# Patient Record
Sex: FEMALE | Race: WHITE | NOT HISPANIC OR LATINO | Employment: OTHER | URBAN - METROPOLITAN AREA
[De-identification: names, ages, dates, MRNs, and addresses within clinical notes are randomized per-mention and may not be internally consistent; named-entity substitution may affect disease eponyms.]

---

## 2018-12-21 ENCOUNTER — HOSPITAL ENCOUNTER (OUTPATIENT)
Dept: RADIOLOGY | Facility: MEDICAL CENTER | Age: 63
End: 2018-12-21

## 2018-12-21 ENCOUNTER — APPOINTMENT (OUTPATIENT)
Dept: RADIOLOGY | Facility: MEDICAL CENTER | Age: 63
DRG: 814 | End: 2018-12-21
Payer: COMMERCIAL

## 2018-12-21 ENCOUNTER — HOSPITAL ENCOUNTER (INPATIENT)
Facility: MEDICAL CENTER | Age: 63
LOS: 5 days | DRG: 814 | End: 2018-12-26
Attending: SURGERY | Admitting: SURGERY
Payer: COMMERCIAL

## 2018-12-21 DIAGNOSIS — T14.90XA TRAUMA: ICD-10-CM

## 2018-12-21 DIAGNOSIS — S36.00XA INJURY OF SPLEEN, INITIAL ENCOUNTER: ICD-10-CM

## 2018-12-21 PROBLEM — E03.8 OTHER SPECIFIED HYPOTHYROIDISM: Status: ACTIVE | Noted: 2018-12-21

## 2018-12-21 PROBLEM — S22.32XD CLOSED FRACTURE OF ONE RIB OF LEFT SIDE WITH ROUTINE HEALING: Status: ACTIVE | Noted: 2018-12-21

## 2018-12-21 PROBLEM — E86.0 DEHYDRATION, MODERATE: Status: ACTIVE | Noted: 2018-12-21

## 2018-12-21 PROBLEM — E66.9 OBESITY (BMI 30-39.9): Status: ACTIVE | Noted: 2018-12-21

## 2018-12-21 PROBLEM — M32.8 OTHER FORMS OF SYSTEMIC LUPUS ERYTHEMATOSUS (HCC): Status: ACTIVE | Noted: 2018-12-21

## 2018-12-21 PROBLEM — I63.9 STROKE (HCC): Status: ACTIVE | Noted: 2018-12-21

## 2018-12-21 PROBLEM — I10 ESSENTIAL HYPERTENSION: Status: ACTIVE | Noted: 2018-12-21

## 2018-12-21 PROBLEM — D69.6 THROMBOCYTOPENIA (HCC): Status: ACTIVE | Noted: 2018-12-21

## 2018-12-21 PROBLEM — Z79.01 ANTICOAGULATED ON COUMADIN: Status: ACTIVE | Noted: 2018-12-21

## 2018-12-21 LAB
ABO GROUP BLD: NORMAL
ABO GROUP BLD: NORMAL
ALBUMIN SERPL BCP-MCNC: 3.6 G/DL (ref 3.2–4.9)
ALBUMIN/GLOB SERPL: 1.4 G/DL
ALP SERPL-CCNC: 66 U/L (ref 30–99)
ALT SERPL-CCNC: 13 U/L (ref 2–50)
ANION GAP SERPL CALC-SCNC: 12 MMOL/L (ref 0–11.9)
APPEARANCE UR: CLEAR
APTT PPP: 25.8 SEC (ref 24.7–36)
AST SERPL-CCNC: 20 U/L (ref 12–45)
BILIRUB SERPL-MCNC: 1.1 MG/DL (ref 0.1–1.5)
BILIRUB UR QL STRIP.AUTO: NEGATIVE
BLD GP AB SCN SERPL QL: NORMAL
BUN SERPL-MCNC: 42 MG/DL (ref 8–22)
CALCIUM SERPL-MCNC: 10 MG/DL (ref 8.5–10.5)
CFT BLD TEG: 4.8 MIN (ref 5–10)
CHLORIDE SERPL-SCNC: 107 MMOL/L (ref 96–112)
CLOT ANGLE BLD TEG: 67.2 DEGREES (ref 53–72)
CLOT LYSIS 30M P MA LENFR BLD TEG: 1.1 % (ref 0–8)
CO2 SERPL-SCNC: 20 MMOL/L (ref 20–33)
COLOR UR: YELLOW
CREAT SERPL-MCNC: 1.5 MG/DL (ref 0.5–1.4)
CT.EXTRINSIC BLD ROTEM: 1.7 MIN (ref 1–3)
ERYTHROCYTE [DISTWIDTH] IN BLOOD BY AUTOMATED COUNT: 50.3 FL (ref 35.9–50)
ETHANOL BLD-MCNC: 0 G/DL
GLOBULIN SER CALC-MCNC: 2.6 G/DL (ref 1.9–3.5)
GLUCOSE SERPL-MCNC: 195 MG/DL (ref 65–99)
GLUCOSE UR STRIP.AUTO-MCNC: NEGATIVE MG/DL
HCG SERPL QL: NEGATIVE
HCT VFR BLD AUTO: 30.1 % (ref 37–47)
HGB BLD-MCNC: 10.2 G/DL (ref 12–16)
HGB BLD-MCNC: 8.6 G/DL (ref 12–16)
INR PPP: 1.67 (ref 0.87–1.13)
KETONES UR STRIP.AUTO-MCNC: NEGATIVE MG/DL
LEUKOCYTE ESTERASE UR QL STRIP.AUTO: NEGATIVE
MCF BLD TEG: 68.2 MM (ref 50–70)
MCH RBC QN AUTO: 34.6 PG (ref 27–33)
MCHC RBC AUTO-ENTMCNC: 33.9 G/DL (ref 33.6–35)
MCV RBC AUTO: 102 FL (ref 81.4–97.8)
MICRO URNS: NORMAL
NITRITE UR QL STRIP.AUTO: NEGATIVE
PA AA BLD-ACNC: 67.3 %
PA ADP BLD-ACNC: 21.2 %
PH UR STRIP.AUTO: 5 [PH]
PLATELET # BLD AUTO: 111 K/UL (ref 164–446)
PMV BLD AUTO: 13.7 FL (ref 9–12.9)
POTASSIUM SERPL-SCNC: 4.7 MMOL/L (ref 3.6–5.5)
PROT SERPL-MCNC: 6.2 G/DL (ref 6–8.2)
PROT UR QL STRIP: NEGATIVE MG/DL
PROTHROMBIN TIME: 19.8 SEC (ref 12–14.6)
RBC # BLD AUTO: 2.95 M/UL (ref 4.2–5.4)
RBC UR QL AUTO: NEGATIVE
RH BLD: NORMAL
RH BLD: NORMAL
SODIUM SERPL-SCNC: 139 MMOL/L (ref 135–145)
SP GR UR STRIP.AUTO: 1.02
TEG ALGORITHM TGALG: ABNORMAL
UROBILINOGEN UR STRIP.AUTO-MCNC: 0.2 MG/DL
WBC # BLD AUTO: 10.3 K/UL (ref 4.8–10.8)

## 2018-12-21 PROCEDURE — 303105 HCHG CATHETER EXTRA

## 2018-12-21 PROCEDURE — 51702 INSERT TEMP BLADDER CATH: CPT

## 2018-12-21 PROCEDURE — 700105 HCHG RX REV CODE 258: Performed by: SURGERY

## 2018-12-21 PROCEDURE — 86900 BLOOD TYPING SEROLOGIC ABO: CPT

## 2018-12-21 PROCEDURE — 700111 HCHG RX REV CODE 636 W/ 250 OVERRIDE (IP): Performed by: SURGERY

## 2018-12-21 PROCEDURE — 86850 RBC ANTIBODY SCREEN: CPT

## 2018-12-21 PROCEDURE — 85576 BLOOD PLATELET AGGREGATION: CPT

## 2018-12-21 PROCEDURE — 81003 URINALYSIS AUTO W/O SCOPE: CPT

## 2018-12-21 PROCEDURE — 85018 HEMOGLOBIN: CPT

## 2018-12-21 PROCEDURE — 99291 CRITICAL CARE FIRST HOUR: CPT

## 2018-12-21 PROCEDURE — 770022 HCHG ROOM/CARE - ICU (200)

## 2018-12-21 PROCEDURE — 80307 DRUG TEST PRSMV CHEM ANLYZR: CPT

## 2018-12-21 PROCEDURE — 85610 PROTHROMBIN TIME: CPT

## 2018-12-21 PROCEDURE — 85730 THROMBOPLASTIN TIME PARTIAL: CPT

## 2018-12-21 PROCEDURE — 84703 CHORIONIC GONADOTROPIN ASSAY: CPT

## 2018-12-21 PROCEDURE — 96374 THER/PROPH/DIAG INJ IV PUSH: CPT

## 2018-12-21 PROCEDURE — G0390 TRAUMA RESPONS W/HOSP CRITI: HCPCS

## 2018-12-21 PROCEDURE — A9270 NON-COVERED ITEM OR SERVICE: HCPCS | Performed by: SURGERY

## 2018-12-21 PROCEDURE — 700102 HCHG RX REV CODE 250 W/ 637 OVERRIDE(OP): Performed by: SURGERY

## 2018-12-21 PROCEDURE — 85384 FIBRINOGEN ACTIVITY: CPT

## 2018-12-21 PROCEDURE — 85027 COMPLETE CBC AUTOMATED: CPT

## 2018-12-21 PROCEDURE — 80053 COMPREHEN METABOLIC PANEL: CPT

## 2018-12-21 PROCEDURE — 85347 COAGULATION TIME ACTIVATED: CPT

## 2018-12-21 PROCEDURE — C9132 KCENTRA, PER I.U.: HCPCS | Performed by: SURGERY

## 2018-12-21 PROCEDURE — 86901 BLOOD TYPING SEROLOGIC RH(D): CPT

## 2018-12-21 RX ORDER — ALLOPURINOL 300 MG/1
300 TABLET ORAL DAILY
COMMUNITY

## 2018-12-21 RX ORDER — ATENOLOL 25 MG/1
50 TABLET ORAL DAILY
COMMUNITY

## 2018-12-21 RX ORDER — SPIRONOLACTONE 50 MG/1
50 TABLET, FILM COATED ORAL 2 TIMES DAILY
COMMUNITY

## 2018-12-21 RX ORDER — POLYETHYLENE GLYCOL 3350 17 G/17G
1 POWDER, FOR SOLUTION ORAL 2 TIMES DAILY
Status: DISCONTINUED | OUTPATIENT
Start: 2018-12-21 | End: 2018-12-26 | Stop reason: HOSPADM

## 2018-12-21 RX ORDER — ACETAMINOPHEN 500 MG
1000 TABLET ORAL EVERY 6 HOURS
Status: DISCONTINUED | OUTPATIENT
Start: 2018-12-21 | End: 2018-12-26

## 2018-12-21 RX ORDER — DOCUSATE SODIUM 100 MG/1
100 CAPSULE, LIQUID FILLED ORAL 2 TIMES DAILY
Status: DISCONTINUED | OUTPATIENT
Start: 2018-12-22 | End: 2018-12-26 | Stop reason: HOSPADM

## 2018-12-21 RX ORDER — AMOXICILLIN 250 MG
1 CAPSULE ORAL
Status: DISCONTINUED | OUTPATIENT
Start: 2018-12-21 | End: 2018-12-26 | Stop reason: HOSPADM

## 2018-12-21 RX ORDER — TORSEMIDE 20 MG/1
20 TABLET ORAL DAILY
COMMUNITY

## 2018-12-21 RX ORDER — ONDANSETRON 2 MG/ML
4 INJECTION INTRAMUSCULAR; INTRAVENOUS EVERY 4 HOURS PRN
Status: DISCONTINUED | OUTPATIENT
Start: 2018-12-21 | End: 2018-12-26 | Stop reason: HOSPADM

## 2018-12-21 RX ORDER — ENEMA 19; 7 G/133ML; G/133ML
1 ENEMA RECTAL
Status: DISCONTINUED | OUTPATIENT
Start: 2018-12-21 | End: 2018-12-26 | Stop reason: HOSPADM

## 2018-12-21 RX ORDER — AMOXICILLIN 250 MG
1 CAPSULE ORAL NIGHTLY
Status: DISCONTINUED | OUTPATIENT
Start: 2018-12-21 | End: 2018-12-24

## 2018-12-21 RX ORDER — BISACODYL 10 MG
10 SUPPOSITORY, RECTAL RECTAL
Status: DISCONTINUED | OUTPATIENT
Start: 2018-12-21 | End: 2018-12-26 | Stop reason: HOSPADM

## 2018-12-21 RX ORDER — SODIUM CHLORIDE, SODIUM LACTATE, POTASSIUM CHLORIDE, CALCIUM CHLORIDE 600; 310; 30; 20 MG/100ML; MG/100ML; MG/100ML; MG/100ML
INJECTION, SOLUTION INTRAVENOUS CONTINUOUS
Status: DISCONTINUED | OUTPATIENT
Start: 2018-12-21 | End: 2018-12-23

## 2018-12-21 RX ORDER — FAMOTIDINE 20 MG/1
20 TABLET, FILM COATED ORAL 2 TIMES DAILY
Status: DISCONTINUED | OUTPATIENT
Start: 2018-12-21 | End: 2018-12-21

## 2018-12-21 RX ADMIN — ACETAMINOPHEN 1000 MG: 500 TABLET ORAL at 23:47

## 2018-12-21 RX ADMIN — SODIUM CHLORIDE, POTASSIUM CHLORIDE, SODIUM LACTATE AND CALCIUM CHLORIDE: 600; 310; 30; 20 INJECTION, SOLUTION INTRAVENOUS at 18:15

## 2018-12-21 RX ADMIN — PROTHROMBIN, COAGULATION FACTOR VII HUMAN, COAGULATION FACTOR IX HUMAN, COAGULATION FACTOR X HUMAN, PROTEIN C, PROTEIN S HUMAN, AND WATER 2669 UNITS: KIT at 16:36

## 2018-12-21 ASSESSMENT — COPD QUESTIONNAIRES
DURING THE PAST 4 WEEKS HOW MUCH DID YOU FEEL SHORT OF BREATH: NONE/LITTLE OF THE TIME
DO YOU EVER COUGH UP ANY MUCUS OR PHLEGM?: NO/ONLY WITH OCCASIONAL COLDS OR INFECTIONS
HAVE YOU SMOKED AT LEAST 100 CIGARETTES IN YOUR ENTIRE LIFE: NO/DON'T KNOW
COPD SCREENING SCORE: 2

## 2018-12-21 ASSESSMENT — PAIN SCALES - GENERAL
PAINLEVEL_OUTOF10: 0

## 2018-12-21 ASSESSMENT — LIFESTYLE VARIABLES: EVER_SMOKED: NEVER

## 2018-12-21 NOTE — ED PROVIDER NOTES
ED Provider Note    CHIEF COMPLAINT  No chief complaint on file.      HPI  Sophia Ordonez is a 63 y.o. female who presents for evaluation of left upper quadrant pain, known splenic injury.  The patient was transferred from [facility].  Apparently 4 days ago she had a ground-level fall struck her left flank and left upper quadrant.  She did not seek medical care.  Symptoms became worse she presented to a small [facility] hospital.  Of note she has a history of lupus anticoagulant and Coumadin.  At the office facility she had a noncontrast CT scan which demonstrated a significant splenic injury with hemoperitoneum.  She had a hemoglobin of 10, she was given vitamin K intramuscularly, no blood products were administered and she was transported here.  She did not receive any blood products prior to arrival and she had no documented episodes of systolic pressure less than 90.  He denies any injury to the head neck chest upper or lower extremity    REVIEW OF SYSTEMS  See HPI for further details.  No loss of consciousness numbness weakness or tingling all other systems are negative.     PAST MEDICAL HISTORY  Past Medical History:   Diagnosis Date   • Shingles    • Lupus    • Hypertension    • Hypothyroid    • Murmur    • Pleurisy    • Pneumonia        FAMILY HISTORY  Noncontributory    SOCIAL HISTORY  Social History     Social History   • Marital status: Single     Spouse name: N/A   • Number of children: N/A   • Years of education: N/A     Social History Main Topics   • Smoking status: Never Smoker   • Smokeless tobacco: Not on file   • Alcohol use Yes      Comment: rare   • Drug use: No   • Sexual activity: Not on file     Other Topics Concern   • Not on file     Social History Narrative   • No narrative on file     No IV drug  SURGICAL HISTORY  Past Surgical History:   Procedure Laterality Date   • GYN SURGERY         • CA TMJ ARTHROSCOPY/DIAGNOSTIC     • TONSILLECTOMY     •  "TONSILLECTOMY         CURRENT MEDICATIONS  Home Medications    **Home medications have not yet been reviewed for this encounter**       Current Facility-Administered Medications:   •  prothrombin complex conc human (KCENTRA) 1000 units kit KIT 2,669 Units, 2,669 Units, Intravenous, Once, Shawn Garcia M.D., 2,669 Units at 12/21/18 1636    Current Outpatient Prescriptions:   •  atenolol (TENORMIN) 50 MG Tab, Take 50 mg by mouth every day., Disp: , Rfl:   •  spironolactone (ALDACTONE) 50 MG Tab, Take 50 mg by mouth every day., Disp: , Rfl:   •  allopurinol (ZYLOPRIM) 300 MG Tab, Take 300 mg by mouth every day., Disp: , Rfl:   •  torsemide (DEMADEX) 20 MG Tab, Take 40 mg by mouth every day., Disp: , Rfl:   •  warfarin (COUMADIN) 5 MG TABS, Take 5 mg by mouth every day. Follow up with [facility] , Disp: , Rfl:   •  levothyroxine (SYNTHROID) 100 MCG TABS, Take 100 mcg by mouth every day., Disp: , Rfl:   •  hydroxychloroquine (PLAQUINIL) 200 MG TABS, Take 200 mg by mouth 2 Times a Day., Disp: , Rfl:   •  aspirin 81 MG tablet, Take 81 mg by mouth every day., Disp: , Rfl:       ALLERGIES  Allergies   Allergen Reactions   • Diovan [Valsartan] Rash   • Micardis [Telmisartan]      Migraine headaches   • Pcn [Penicillins] Rash   • Sulfa Drugs        PHYSICAL EXAM  VITAL SIGNS: /62   Pulse 78   Temp 36.4 °C (97.5 °F) (Temporal)   Resp 20   Ht 1.702 m (5' 7\")   Wt 113.4 kg (250 lb)   SpO2 96%   BMI 39.16 kg/m²       Constitutional: Ill-appearing  HENT: Normocephalic, Atraumatic, Bilateral external ears normal, Oropharynx moist, No oral exudates, Nose normal.   Eyes: PERRLA, EOMI, Conjunctiva normal, No discharge.   Neck: Normal range of motion, No tenderness, Supple, No stridor.   Cardiovascular: Normal heart rate, Normal rhythm, No murmurs, No rubs, No gallops.   Thorax & Lungs: Normal breath sounds, No respiratory distress, No wheezing, No chest tenderness.   Abdomen: Bowel sounds normal, Soft, left " upper quadrant tenderness with ecchymosis noted over the left lateral abdominal and flank  Skin: Warm, Dry, No erythema, No rash.   Back: No tenderness, No CVA tenderness.   Extremities: Intact distal pulses, No edema, No tenderness, No cyanosis, No clubbing. .   Neurologic: Alert & oriented x 3, Normal motor function, Normal sensory function, No focal deficits noted.   Psychiatric: Anxious      RADIOLOGY/PROCEDURES  Results for orders placed or performed during the hospital encounter of 12/21/18   DIAGNOSTIC ALCOHOL   Result Value Ref Range    Diagnostic Alcohol 0.00 0.00 g/dL   CBC WITHOUT DIFFERENTIAL   Result Value Ref Range    WBC 10.3 4.8 - 10.8 K/uL    RBC 2.95 (L) 4.20 - 5.40 M/uL    Hemoglobin 10.2 (L) 12.0 - 16.0 g/dL    Hematocrit 30.1 (L) 37.0 - 47.0 %    .0 (H) 81.4 - 97.8 fL    MCH 34.6 (H) 27.0 - 33.0 pg    MCHC 33.9 33.6 - 35.0 g/dL    RDW 50.3 (H) 35.9 - 50.0 fL    Platelet Count 111 (L) 164 - 446 K/uL    MPV 13.7 (H) 9.0 - 12.9 fL   COMP METABOLIC PANEL   Result Value Ref Range    Sodium 139 135 - 145 mmol/L    Potassium 4.7 3.6 - 5.5 mmol/L    Chloride 107 96 - 112 mmol/L    Co2 20 20 - 33 mmol/L    Anion Gap 12.0 (H) 0.0 - 11.9    Glucose 195 (H) 65 - 99 mg/dL    Bun 42 (H) 8 - 22 mg/dL    Creatinine 1.50 (H) 0.50 - 1.40 mg/dL    Calcium 10.0 8.5 - 10.5 mg/dL    AST(SGOT) 20 12 - 45 U/L    ALT(SGPT) 13 2 - 50 U/L    Alkaline Phosphatase 66 30 - 99 U/L    Total Bilirubin 1.1 0.1 - 1.5 mg/dL    Albumin 3.6 3.2 - 4.9 g/dL    Total Protein 6.2 6.0 - 8.2 g/dL    Globulin 2.6 1.9 - 3.5 g/dL    A-G Ratio 1.4 g/dL   Prothrombin Time   Result Value Ref Range    PT 19.8 (H) 12.0 - 14.6 sec    INR 1.67 (H) 0.87 - 1.13   APTT   Result Value Ref Range    APTT 25.8 24.7 - 36.0 sec   HCG QUAL SERUM   Result Value Ref Range    Beta-Hcg Qualitative Serum Negative Negative   COD - Adult (Type and Screen)   Result Value Ref Range    ABO Grouping Only A     Rh Grouping Only POS     Antibody Screen-Cod NEG     ABO AND RH CONFIRMATION   Result Value Ref Range    ABO Grouping Only A     Second Rh Group POS    ESTIMATED GFR   Result Value Ref Range    GFR If  43 (A) >60 mL/min/1.73 m 2    GFR If Non African American 35 (A) >60 mL/min/1.73 m 2      CT scan from the outside facility was performed without contrast.  It demonstrates hemoperitoneum with a splenic injury no IV contrast was administered.  COURSE & MEDICAL DECISION MAKING  Pertinent Labs & Imaging studies reviewed. (See chart for details)  Patient was a trauma yellow, trauma surgery is at the bedside on arrival.  Here she had borderline hypotension with systolic pressure of 90/60.  We reviewed her laboratory studies as well as her CT scan.  Repeat laboratory studies demonstrate a stable hemoglobin however she has ongoing coagulopathy.  She is in guarded condition given her age, comorbidities coagulopathy.  She was given PCC therapy in coordination with the trauma team should be admitted to the trauma ICU for close observation however operative intervention is not planned at this time.  Patient is in very guarded condition    CRITICAL CARE TIME:    The patient required approximately 38 minutes worth of critical care time. This excludes any procedures. This includes time spent directly at caring for the patient, making critical medical decisions, involving consultants and speaking with the family.    FINAL IMPRESSION  1.  Compensated hemorrhagic shock  2.  Splenic injury  3.  Coagulopathy due to Coumadin    Admission to ICU        Electronically signed by: Andrei Segovia, 12/21/2018 3:38 PM

## 2018-12-21 NOTE — ASSESSMENT & PLAN NOTE
Chronic condition treated with Atenolol, Demadex, and Aldactone.  12/24 Resume maintenance medication Atenolol   Hold Demadex and Aldactone. (trend renal indices)

## 2018-12-21 NOTE — ASSESSMENT & PLAN NOTE
Tripped over cat 4 days PTA - struck left side on couch   Seen at [facility] on 12/21  Trauma Yellow Transfer Activation.  Shawn Garcia MD. Trauma Surgery.

## 2018-12-21 NOTE — ASSESSMENT & PLAN NOTE
Chronic condition treated with Plaquenil.  12/24Resumed maintenance medication.  No steroids > 10 yrs  Chronic kidney disease , creatinine generally in the 1.4-1.5 range

## 2018-12-21 NOTE — ASSESSMENT & PLAN NOTE
No contrast given. Large amount of left upper quadrant perisplenic acute hemorrhage.   Trend abdominal exam and laboratory studies

## 2018-12-21 NOTE — ASSESSMENT & PLAN NOTE
Single left rib fracture   Aggressive multimodal pain management and pulmonary hygiene.   Trend chest radiographs.

## 2018-12-22 ENCOUNTER — APPOINTMENT (OUTPATIENT)
Dept: RADIOLOGY | Facility: MEDICAL CENTER | Age: 63
DRG: 814 | End: 2018-12-22
Attending: SURGERY
Payer: COMMERCIAL

## 2018-12-22 LAB
ALBUMIN SERPL BCP-MCNC: 3 G/DL (ref 3.2–4.9)
ALBUMIN/GLOB SERPL: 1.4 G/DL
ALP SERPL-CCNC: 52 U/L (ref 30–99)
ALT SERPL-CCNC: 9 U/L (ref 2–50)
ANION GAP SERPL CALC-SCNC: 7 MMOL/L (ref 0–11.9)
APTT PPP: 23.8 SEC (ref 24.7–36)
AST SERPL-CCNC: 16 U/L (ref 12–45)
BARCODED ABORH UBTYP: 6200
BARCODED PRD CODE UBPRD: NORMAL
BARCODED UNIT NUM UBUNT: NORMAL
BASOPHILS # BLD AUTO: 0.1 % (ref 0–1.8)
BASOPHILS # BLD: 0.01 K/UL (ref 0–0.12)
BILIRUB SERPL-MCNC: 0.9 MG/DL (ref 0.1–1.5)
BUN SERPL-MCNC: 44 MG/DL (ref 8–22)
CALCIUM SERPL-MCNC: 9.4 MG/DL (ref 8.5–10.5)
CHLORIDE SERPL-SCNC: 109 MMOL/L (ref 96–112)
CO2 SERPL-SCNC: 24 MMOL/L (ref 20–33)
COMPONENT R 8504R: NORMAL
CREAT SERPL-MCNC: 1.47 MG/DL (ref 0.5–1.4)
EOSINOPHIL # BLD AUTO: 0 K/UL (ref 0–0.51)
EOSINOPHIL NFR BLD: 0 % (ref 0–6.9)
ERYTHROCYTE [DISTWIDTH] IN BLOOD BY AUTOMATED COUNT: 50 FL (ref 35.9–50)
GLOBULIN SER CALC-MCNC: 2.2 G/DL (ref 1.9–3.5)
GLUCOSE SERPL-MCNC: 131 MG/DL (ref 65–99)
HCT VFR BLD AUTO: 22.1 % (ref 37–47)
HGB BLD-MCNC: 7 G/DL (ref 12–16)
HGB BLD-MCNC: 7.2 G/DL (ref 12–16)
HGB BLD-MCNC: 7.5 G/DL (ref 12–16)
IMM GRANULOCYTES # BLD AUTO: 0.03 K/UL (ref 0–0.11)
IMM GRANULOCYTES NFR BLD AUTO: 0.4 % (ref 0–0.9)
LYMPHOCYTES # BLD AUTO: 0.84 K/UL (ref 1–4.8)
LYMPHOCYTES NFR BLD: 12 % (ref 22–41)
MCH RBC QN AUTO: 34.6 PG (ref 27–33)
MCHC RBC AUTO-ENTMCNC: 33.9 G/DL (ref 33.6–35)
MCV RBC AUTO: 101.8 FL (ref 81.4–97.8)
MONOCYTES # BLD AUTO: 0.62 K/UL (ref 0–0.85)
MONOCYTES NFR BLD AUTO: 8.9 % (ref 0–13.4)
NEUTROPHILS # BLD AUTO: 5.5 K/UL (ref 2–7.15)
NEUTROPHILS NFR BLD: 78.6 % (ref 44–72)
NRBC # BLD AUTO: 0 K/UL
NRBC BLD-RTO: 0 /100 WBC
PLATELET # BLD AUTO: 78 K/UL (ref 164–446)
PMV BLD AUTO: 13.7 FL (ref 9–12.9)
POTASSIUM SERPL-SCNC: 4.9 MMOL/L (ref 3.6–5.5)
PRODUCT TYPE UPROD: NORMAL
PROT SERPL-MCNC: 5.2 G/DL (ref 6–8.2)
RBC # BLD AUTO: 2.17 M/UL (ref 4.2–5.4)
SODIUM SERPL-SCNC: 140 MMOL/L (ref 135–145)
UNIT STATUS USTAT: NORMAL
WBC # BLD AUTO: 7 K/UL (ref 4.8–10.8)

## 2018-12-22 PROCEDURE — 700105 HCHG RX REV CODE 258: Performed by: SURGERY

## 2018-12-22 PROCEDURE — 36430 TRANSFUSION BLD/BLD COMPNT: CPT

## 2018-12-22 PROCEDURE — 86923 COMPATIBILITY TEST ELECTRIC: CPT

## 2018-12-22 PROCEDURE — 80053 COMPREHEN METABOLIC PANEL: CPT

## 2018-12-22 PROCEDURE — 99291 CRITICAL CARE FIRST HOUR: CPT | Performed by: SURGERY

## 2018-12-22 PROCEDURE — 71045 X-RAY EXAM CHEST 1 VIEW: CPT

## 2018-12-22 PROCEDURE — 85730 THROMBOPLASTIN TIME PARTIAL: CPT

## 2018-12-22 PROCEDURE — 30233N1 TRANSFUSION OF NONAUTOLOGOUS RED BLOOD CELLS INTO PERIPHERAL VEIN, PERCUTANEOUS APPROACH: ICD-10-PCS | Performed by: SURGERY

## 2018-12-22 PROCEDURE — 93970 EXTREMITY STUDY: CPT

## 2018-12-22 PROCEDURE — A6250 SKIN SEAL PROTECT MOISTURIZR: HCPCS | Performed by: SURGERY

## 2018-12-22 PROCEDURE — 700102 HCHG RX REV CODE 250 W/ 637 OVERRIDE(OP): Performed by: SURGERY

## 2018-12-22 PROCEDURE — A9270 NON-COVERED ITEM OR SERVICE: HCPCS | Performed by: SURGERY

## 2018-12-22 PROCEDURE — 85018 HEMOGLOBIN: CPT | Mod: 91

## 2018-12-22 PROCEDURE — 85025 COMPLETE CBC W/AUTO DIFF WBC: CPT

## 2018-12-22 PROCEDURE — 93970 EXTREMITY STUDY: CPT | Mod: 26 | Performed by: SURGERY

## 2018-12-22 PROCEDURE — 770022 HCHG ROOM/CARE - ICU (200)

## 2018-12-22 PROCEDURE — P9016 RBC LEUKOCYTES REDUCED: HCPCS

## 2018-12-22 RX ADMIN — SODIUM CHLORIDE, POTASSIUM CHLORIDE, SODIUM LACTATE AND CALCIUM CHLORIDE: 600; 310; 30; 20 INJECTION, SOLUTION INTRAVENOUS at 11:53

## 2018-12-22 RX ADMIN — ACETAMINOPHEN 1000 MG: 500 TABLET ORAL at 05:02

## 2018-12-22 RX ADMIN — ACETAMINOPHEN 1000 MG: 500 TABLET ORAL at 23:25

## 2018-12-22 ASSESSMENT — PAIN SCALES - GENERAL
PAINLEVEL_OUTOF10: 0

## 2018-12-22 ASSESSMENT — ENCOUNTER SYMPTOMS: ABDOMINAL PAIN: 1

## 2018-12-22 NOTE — H&P
Trauma History and Physical  2018    Attending Physician: Shawn Garcia MD.     CC: Trauma The patient was triaged as a Trauma Yellow Transfer in accordance with established pre hospital protols. An expeditious primary and secondary survey with required adjuncts was conducted. See Trauma Narrator for full details.    HPI: This is a 63 y.o female presents to [facility] Emergency Department for evaluation of splenic injury after a fall 4 days ago. The patient was a transfer from [facility]. The patient states she tripped over her cat 4 days ago and struck her left flank and left upper quadrant.  She did not seek medical care at that time.  She developed increasing abdominal pain today which became worse.  She presented to the [facility] for evaluation.  She has a history of lupus anticoagulant and Coumadin.  At the outlying facility, she had a noncontrast CT scan which demonstrated a splenic injury with surrounding clot and hemoperitoneum.  She was found to have a Hb of 10 - her baseline is 15. She was given vitamin K intramuscularly prior to transport. She was given no blood products at the sending facility.  She had no documented episodes of systolic pressure less than 90.  She denies any injury to the head neck chest upper or lower extremity .  She did not lose consciousness.     Past Medical History:   Diagnosis Date   • Hypertension    • Hypothyroid    • Lupus    • Murmur    • Pleurisy    • Pneumonia    • Shingles        Past Surgical History:   Procedure Laterality Date   • GYN SURGERY         • PB TMJ ARTHROSCOPY/DIAGNOSTIC     • TONSILLECTOMY     • TONSILLECTOMY         Current Facility-Administered Medications   Medication Dose Route Frequency Provider Last Rate Last Dose   • Respiratory Care per Protocol   Nebulization Continuous RT Shawn Garcia M.D.       • Pharmacy Consult Request ...Pain Management Review 1 Each  1 Each  Other PRN Shawn Garcia M.D.       • [START ON 12/22/2018] docusate sodium (COLACE) capsule 100 mg  100 mg Oral BID Shawn Garcia M.D.       • senna-docusate (PERICOLACE or SENOKOT S) 8.6-50 MG per tablet 1 Tab  1 Tab Oral Nightly Shawn Garcia M.D.       • senna-docusate (PERICOLACE or SENOKOT S) 8.6-50 MG per tablet 1 Tab  1 Tab Oral Q24HRS PRN Shawn Garcia M.D.       • polyethylene glycol/lytes (MIRALAX) PACKET 1 Packet  1 Packet Oral BID Shawn Garcia M.D.       • [START ON 12/22/2018] magnesium hydroxide (MILK OF MAGNESIA) suspension 30 mL  30 mL Oral DAILY Shawn Garcia M.D.       • bisacodyl (DULCOLAX) suppository 10 mg  10 mg Rectal Q24HRS PRN Shawn Garcia M.D.       • fleet enema 133 mL  1 Each Rectal Once PRN Shawn Garcia M.D.       • LR infusion   Intravenous Continuous Shawn Garcia M.D.       • acetaminophen (TYLENOL) tablet 1,000 mg  1,000 mg Oral Q6HRS Shawn Garcia M.D.       • fentaNYL (SUBLIMAZE) injection 50 mcg  50 mcg Intravenous Q HOUR PRN Shawn Garcia M.D.       • ondansetron (ZOFRAN) syringe/vial injection 4 mg  4 mg Intravenous Q4HRS PRN Shawn Garcia M.D.         Current Outpatient Prescriptions   Medication Sig Dispense Refill   • atenolol (TENORMIN) 50 MG Tab Take 50 mg by mouth every day.     • spironolactone (ALDACTONE) 50 MG Tab Take 50 mg by mouth every day.     • allopurinol (ZYLOPRIM) 300 MG Tab Take 300 mg by mouth every day.     • torsemide (DEMADEX) 20 MG Tab Take 40 mg by mouth every day.     • warfarin (COUMADIN) 5 MG TABS Take 5 mg by mouth every day. Follow up with Coumadin Clinic      • levothyroxine (SYNTHROID) 100 MCG TABS Take 100 mcg by mouth every day.     • hydroxychloroquine (PLAQUINIL) 200 MG TABS Take 200 mg by mouth 2 Times a Day.     • aspirin 81 MG tablet Take 81 mg by mouth every day.         Social History     Social History   • Marital status: Single     Spouse name: N/A  "  • Number of children: N/A   • Years of education: N/A     Occupational History   • Not on file.     Social History Main Topics   • Smoking status: Never Smoker   • Smokeless tobacco: Not on file   • Alcohol use Yes      Comment: rare   • Drug use: No   • Sexual activity: Not on file     Other Topics Concern   • Not on file     Social History Narrative   • No narrative on file       No family history on file.    Allergies:  Diovan [valsartan]; Micardis [telmisartan]; Pcn [penicillins]; and Sulfa drugs    Review of Systems:  Constitutional: Negative for fever, chills, weight loss, malaise/fatigue and diaphoresis.   HENT: Negative for hearing loss, ear pain, nosebleeds, congestion, sore throat, neck pain, and ear discharge.    Eyes: Negative for blurred vision, double vision, and redness.   Respiratory: Negative for cough, sputum production, shortness of breath, wheezing and stridor.   Cardiovascular: Negative for chest pain, palpitations.   Gastrointestinal: Negative for heartburn, nausea, vomiting,  diarrhea, constipation. Positive for abdominal pain.  Genitourinary: Negative for dysuria, urgency, frequency.   Musculoskeletal: Negative for myalgias, back pain, joint pain and falls.   Skin: Negative for itching and rash.  Neurological: Negative for dizziness, loss of consciousness, weakness and headaches.   Endo/Heme/Allergies: Negative for environmental allergies. Does not bruise/bleed easily.   Psychiatric/Behavioral: Negative for depression and substance abuse. The patient is not nervous/anxious.    Physical Exam:  Blood pressure 120/62, pulse 68, temperature 36.4 °C (97.5 °F), temperature source Temporal, resp. rate (!) 21, height 1.702 m (5' 7\"), weight 113.4 kg (250 lb), SpO2 100 %, not currently breastfeeding.    Constitutional: Awake, alert, oriented x3. No acute distress. GCS 15. E4 V5 M6.  Head: No cephalohematoma. Pupils are 3 mm,  reactive bilaterally. Midface stable. No malocclusion.  TMs clear " bilaterally. No drainage from the mouth or nose.  Neck: No tracheal deviation. No midline cervical spine tenderness. C-collar in place. No cervical seatbelt sign.  Cardiovascular: Normal rate, regular rhythm, normal heart sounds and intact distal pulses.  Exam reveals no gallop and no friction rub.  No murmur heard.  Pulmonary/Chest: Clavicles nontender to palpation. There is no chest wall tenderness bilaterally.  No crepitus. Positive breath sounds bilaterally.   Abdominal: Soft, obese, nondistended. Bowel sounds normal.  Left upper quadrant tenderness with ecchymosis noted over the left lateral abdominal and flank. No guarding or rebound tenderness. Pelvis is stable to anterior-posterior compression. No abdominal seatbelt sign.   Musculoskeletal: Right upper extremity grossly atraumatic, palpable radial pulse. 5/5  strength. Full ROM and strength at elbow.  Left upper extremity grossly atraumatic, palpable radial pulse. 5/5  strength. Full ROM and strength at elbow.  Right lower extremity grossly atraumatic. 5/5 strength in ankle plantar flexion and dorsiflexion. No pain and full ROM at right knee and hip. 2 + LE edema  Left  lower extremity grossly atraumatic. 5/5 strength in ankle plantar flexion and dorsiflexion. No pain and full ROM at left knee and hip.  2 + LE edema  Back: Midline thoracic and lumbar spines are nontender to palpation. No step-offs.   : Normal female external genitalia. Rectal exam not done. No blood visible at urethral meatus.   Neurological: Sensation intact to light touch dorsum and plantar surfaces of both feet and the medial and lateral aspects of both lower legs.  Sensation intact to light touch dorsum and plantar surfaces of both hands.   Skin: Skin is warm and dry.  No diaphoresis. No erythema. No pallor.     Labs:  Recent Labs      12/21/18   1532   WBC  10.3   RBC  2.95*   HEMOGLOBIN  10.2*   HEMATOCRIT  30.1*   MCV  102.0*   MCH  34.6*   MCHC  33.9   RDW  50.3*    PLATELETCT  111*   MPV  13.7*     Recent Labs      12/21/18   1532   SODIUM  139   POTASSIUM  4.7   CHLORIDE  107   CO2  20   GLUCOSE  195*   BUN  42*   CREATININE  1.50*   CALCIUM  10.0     Recent Labs      12/21/18   1532   APTT  25.8   INR  1.67*     Recent Labs      12/21/18   1532   ASTSGOT  20   ALTSGPT  13   TBILIRUBIN  1.1   ALKPHOSPHAT  66   GLOBULIN  2.6   INR  1.67*       Radiology:  OUTSIDE IMAGES-CT ABDOMEN /PELVIS   Final Result            Assessment: This is a 63 y.o. 4 day old spleen injury, anticoagulation with coumadin, dehydration, left rib fracture x 1, obesity    Plan:   Admit to ICU  KCentra 25 units / kg  Serial H/H  Spleen immunizations  High risk for needing transfusion  High risk for needing splenectomy    Trauma  Tripped over cat 4 days PTA - struck left side on couch   Se[facility] at [facility] on 12/21  Trauma Yellow Transfer Activation.  Shawn Garcia MD. Trauma Surgery.    Spleen injury  Large amount of left upper quadrant perisplenic acute hemorrhage.   No contrast given  Serial hemoglobin   Serial abdominal exams    Closed fracture of one rib of left side with routine healing  Single left rib fracture   Aggressive multimodal pain management and pulmonary hygiene.   Serial chest radiographs.     Stroke (HCC)  Premorbid  On coumadin for prevention    Anticoagulated on Coumadin  Vitamin K prior to arrival  INR 1.67  KCentra in ER ~ 25 units /kg    Other specified hypothyroidism  Chronic condition treated with levothyroxine   Resume maintenance medication when appropriate     Other forms of systemic lupus erythematosus (HCC)  Plaquenil   No steroids > 10 yrs    Essential hypertension  Chronic condition treated with Metoprolol.  Resume maintenance medication when appropriate     Obesity (BMI 30-39.9)  BMI 39.2    Dehydration, moderate  Dry mucus membranes  BUN 42 / Cr 1.5  Follow    Thrombocytopenia (HCC)  Platelet count 111  Follow    Time spent: Trauma / Critical Care Time 90 minutes  excluding procedures.    Shawn Garcia MD  [facility]  [phone]

## 2018-12-22 NOTE — PROGRESS NOTES
2 RN Skin check, areas of concern include, bruising on the left flank, redness under bilateral breasts, and panus. Barrier paste applied. Mepilex in place on pink sacrum.

## 2018-12-22 NOTE — DISCHARGE PLANNING
"Trauma Response    Referral: Trauma yellow Response    Intervention: SW responded to trauma yellow transfer from [facility].  Pt was BIB [facility] after falling over her cat 4 days ago.  Pt was alert upon arrival.  Pts name is Sophia Ordonez (: 1955).  SW obtained the following pt information: MSW met with pt. Pt stated her sister Fiordaliza \"Monica\" Deepthi ([phone]) and her daughter Isabel are already on their way from [place], [place]. Should arrive shortly at [facility]. No other needs from pt at this time.     Plan: MSW to remain available for support      "

## 2018-12-22 NOTE — PROGRESS NOTES
2 RN skin check.    Pt has:    -left flank hematoma/purple ecchymosis  -redness/rash under breasts, bilaterally. Barrier paste applied.  -redness/rash underneath panus/abdomen  -dry cracking/edematous feet, bilaterally.    mepilex in place and wound pictures taken.

## 2018-12-22 NOTE — CARE PLAN
Problem: Communication  Goal: The ability to communicate needs accurately and effectively will improve  Outcome: PROGRESSING AS EXPECTED  Encourage patient to voice concerns over pain and discomfort.     Problem: Safety  Goal: Will remain free from injury  Outcome: PROGRESSING AS EXPECTED  Oriented patient to call light, educated on calling for assistance. Bed in locked and lowest position.

## 2018-12-22 NOTE — PROGRESS NOTES
"Pt to S108 at this time.    VS are as follows:    HR 67 sinus  /84  RR 14  o2 100% via 2L NC  Temp 97.7f  Weight 115.7kg    Pt is AOx4 and in no apparent distress at this time.    C/o mild nausea when turning, but states she is \"okay right now.\" does not want medication at this time.      "

## 2018-12-22 NOTE — ED NOTES
Med Rec completed per patient, [facility]   Allergies reviewed  No ORAL antibiotics in last 30 days

## 2018-12-22 NOTE — PROGRESS NOTES
Trauma / Surgical Daily Progress Note    Date of Service  12/22/2018    Chief Complaint  63 y.o. female admitted 12/21/2018 with Trauma    Interval Events  Hemodynamics have been stable  Significant hemoglobin drift  Continued potential for acute deterioration  Tertiary survey completed and negative  Continue ICU  Azotemia reflects chronic kidney disease due to lupus  No peritoneal findings this morning    Review of Systems  Review of Systems   Cardiovascular: Positive for chest pain.   Gastrointestinal: Positive for abdominal pain.   All other systems reviewed and are negative.       Vital Signs for last 24 hours  Temp:  [36.4 °C (97.5 °F)-36.9 °C (98.4 °F)] 36.9 °C (98.4 °F)  Pulse:  [] 68  Resp:  [16-27] 27  BP: (120-124)/(44-62) 120/62    Hemodynamic parameters for last 24 hours       Respiratory Data     Respiration: (!) 27, Pulse Oximetry: 99 %        RUL Breath Sounds: Clear, RML Breath Sounds: Diminished, RLL Breath Sounds: Diminished, GUERITA Breath Sounds: Clear, LLL Breath Sounds: Diminished    Physical Exam  Physical Exam   Constitutional: She is oriented to person, place, and time. She appears well-developed.   HENT:   Head: Normocephalic.   Eyes: Pupils are equal, round, and reactive to light. Conjunctivae and EOM are normal. No scleral icterus.   Neck: Normal range of motion. Neck supple. No JVD present. No tracheal deviation present. No thyromegaly present.   Cardiovascular: Normal rate, regular rhythm, normal heart sounds and intact distal pulses.    No murmur heard.  Pulmonary/Chest: Effort normal and breath sounds normal. No respiratory distress.   Abdominal: Soft. Bowel sounds are normal. She exhibits no distension. There is tenderness. There is no rebound.   Musculoskeletal: She exhibits no edema.   Lymphadenopathy:     She has no cervical adenopathy.   Neurological: She is alert and oriented to person, place, and time. No cranial nerve deficit.   Skin: Skin is warm and dry.   Psychiatric: She  has a normal mood and affect.   Nursing note and vitals reviewed.      Laboratory  Recent Results (from the past 24 hour(s))   DIAGNOSTIC ALCOHOL    Collection Time: 12/21/18  3:32 PM   Result Value Ref Range    Diagnostic Alcohol 0.00 0.00 g/dL   CBC WITHOUT DIFFERENTIAL    Collection Time: 12/21/18  3:32 PM   Result Value Ref Range    WBC 10.3 4.8 - 10.8 K/uL    RBC 2.95 (L) 4.20 - 5.40 M/uL    Hemoglobin 10.2 (L) 12.0 - 16.0 g/dL    Hematocrit 30.1 (L) 37.0 - 47.0 %    .0 (H) 81.4 - 97.8 fL    MCH 34.6 (H) 27.0 - 33.0 pg    MCHC 33.9 33.6 - 35.0 g/dL    RDW 50.3 (H) 35.9 - 50.0 fL    Platelet Count 111 (L) 164 - 446 K/uL    MPV 13.7 (H) 9.0 - 12.9 fL   COMP METABOLIC PANEL    Collection Time: 12/21/18  3:32 PM   Result Value Ref Range    Sodium 139 135 - 145 mmol/L    Potassium 4.7 3.6 - 5.5 mmol/L    Chloride 107 96 - 112 mmol/L    Co2 20 20 - 33 mmol/L    Anion Gap 12.0 (H) 0.0 - 11.9    Glucose 195 (H) 65 - 99 mg/dL    Bun 42 (H) 8 - 22 mg/dL    Creatinine 1.50 (H) 0.50 - 1.40 mg/dL    Calcium 10.0 8.5 - 10.5 mg/dL    AST(SGOT) 20 12 - 45 U/L    ALT(SGPT) 13 2 - 50 U/L    Alkaline Phosphatase 66 30 - 99 U/L    Total Bilirubin 1.1 0.1 - 1.5 mg/dL    Albumin 3.6 3.2 - 4.9 g/dL    Total Protein 6.2 6.0 - 8.2 g/dL    Globulin 2.6 1.9 - 3.5 g/dL    A-G Ratio 1.4 g/dL   Prothrombin Time    Collection Time: 12/21/18  3:32 PM   Result Value Ref Range    PT 19.8 (H) 12.0 - 14.6 sec    INR 1.67 (H) 0.87 - 1.13   APTT    Collection Time: 12/21/18  3:32 PM   Result Value Ref Range    APTT 25.8 24.7 - 36.0 sec   HCG QUAL SERUM    Collection Time: 12/21/18  3:32 PM   Result Value Ref Range    Beta-Hcg Qualitative Serum Negative Negative   PLATELET MAPPING WITH BASIC TEG    Collection Time: 12/21/18  3:32 PM   Result Value Ref Range    Reaction Time Initial-R 4.8 (L) 5.0 - 10.0 min    Clot Kinetics-K 1.7 1.0 - 3.0 min    Clot Angle-Angle 67.2 53.0 - 72.0 degrees    Maximum Clot Strength-MA 68.2 50.0 - 70.0 mm    Lysis  30 minutes-LY30 1.1 0.0 - 8.0 %    % Inhibition ADP 21.2 %    % Inhibition AA 67.3 %    TEG Algorithm Link Algorithm    COD - Adult (Type and Screen)    Collection Time: 12/21/18  3:32 PM   Result Value Ref Range    ABO Grouping Only A     Rh Grouping Only POS     Antibody Screen-Cod NEG    ABO AND RH CONFIRMATION    Collection Time: 12/21/18  3:32 PM   Result Value Ref Range    ABO Grouping Only A     Second Rh Group POS    ESTIMATED GFR    Collection Time: 12/21/18  3:32 PM   Result Value Ref Range    GFR If  43 (A) >60 mL/min/1.73 m 2    GFR If Non African American 35 (A) >60 mL/min/1.73 m 2   URINALYSIS    Collection Time: 12/21/18  4:22 PM   Result Value Ref Range    Color Yellow     Character Clear     Specific Gravity 1.019 <1.035    Ph 5.0 5.0 - 8.0    Glucose Negative Negative mg/dL    Ketones Negative Negative mg/dL    Protein Negative Negative mg/dL    Bilirubin Negative Negative    Urobilinogen, Urine 0.2 Negative    Nitrite Negative Negative    Leukocyte Esterase Negative Negative    Occult Blood Negative Negative    Micro Urine Req see below    Hemoglobin - Q6 hours x4    Collection Time: 12/21/18  9:20 PM   Result Value Ref Range    Hemoglobin 8.6 (L) 12.0 - 16.0 g/dL   CBC with Differential: Tomorrow AM    Collection Time: 12/22/18  3:30 AM   Result Value Ref Range    WBC 7.0 4.8 - 10.8 K/uL    RBC 2.17 (L) 4.20 - 5.40 M/uL    Hemoglobin 7.5 (L) 12.0 - 16.0 g/dL    Hematocrit 22.1 (L) 37.0 - 47.0 %    .8 (H) 81.4 - 97.8 fL    MCH 34.6 (H) 27.0 - 33.0 pg    MCHC 33.9 33.6 - 35.0 g/dL    RDW 50.0 35.9 - 50.0 fL    Platelet Count 78 (L) 164 - 446 K/uL    MPV 13.7 (H) 9.0 - 12.9 fL    Neutrophils-Polys 78.60 (H) 44.00 - 72.00 %    Lymphocytes 12.00 (L) 22.00 - 41.00 %    Monocytes 8.90 0.00 - 13.40 %    Eosinophils 0.00 0.00 - 6.90 %    Basophils 0.10 0.00 - 1.80 %    Immature Granulocytes 0.40 0.00 - 0.90 %    Nucleated RBC 0.00 /100 WBC    Neutrophils (Absolute) 5.50 2.00 - 7.15  K/uL    Lymphs (Absolute) 0.84 (L) 1.00 - 4.80 K/uL    Monos (Absolute) 0.62 0.00 - 0.85 K/uL    Eos (Absolute) 0.00 0.00 - 0.51 K/uL    Baso (Absolute) 0.01 0.00 - 0.12 K/uL    Immature Granulocytes (abs) 0.03 0.00 - 0.11 K/uL    NRBC (Absolute) 0.00 K/uL   Comp Metabolic Panel (CMP): Tomorrow AM    Collection Time: 12/22/18  3:30 AM   Result Value Ref Range    Sodium 140 135 - 145 mmol/L    Potassium 4.9 3.6 - 5.5 mmol/L    Chloride 109 96 - 112 mmol/L    Co2 24 20 - 33 mmol/L    Anion Gap 7.0 0.0 - 11.9    Glucose 131 (H) 65 - 99 mg/dL    Bun 44 (H) 8 - 22 mg/dL    Creatinine 1.47 (H) 0.50 - 1.40 mg/dL    Calcium 9.4 8.5 - 10.5 mg/dL    AST(SGOT) 16 12 - 45 U/L    ALT(SGPT) 9 2 - 50 U/L    Alkaline Phosphatase 52 30 - 99 U/L    Total Bilirubin 0.9 0.1 - 1.5 mg/dL    Albumin 3.0 (L) 3.2 - 4.9 g/dL    Total Protein 5.2 (L) 6.0 - 8.2 g/dL    Globulin 2.2 1.9 - 3.5 g/dL    A-G Ratio 1.4 g/dL   APTT (PTT): Tomorrow AM    Collection Time: 12/22/18  3:30 AM   Result Value Ref Range    APTT 23.8 (L) 24.7 - 36.0 sec   ESTIMATED GFR    Collection Time: 12/22/18  3:30 AM   Result Value Ref Range    GFR If  44 (A) >60 mL/min/1.73 m 2    GFR If Non  36 (A) >60 mL/min/1.73 m 2       Fluids    Intake/Output Summary (Last 24 hours) at 12/22/18 0823  Last data filed at 12/22/18 0800   Gross per 24 hour   Intake          2318.75 ml   Output              850 ml   Net          1468.75 ml       Core Measures & Quality Metrics  Labs reviewed, Radiology images reviewed and Medications reviewed  Alcazar catheter: No Alcazar      DVT Prophylaxis: Contraindicated - High bleeding risk  DVT prophylaxis - mechanical: SCDs  Ulcer prophylaxis: Yes        CHIARA Score  ETOH Screening    Assessment/Plan  Spleen injury- (present on admission)   Assessment & Plan    Large amount of left upper quadrant perisplenic acute hemorrhage.   No contrast given  Serial hemoglobin , significant drift, hemodynamic stability  Serial  abdominal exams, no peritonitis     Thrombocytopenia (HCC)- (present on admission)   Assessment & Plan    Platelet count 111  Follow       Dehydration, moderate- (present on admission)   Assessment & Plan    Dry mucus membranes  BUN 42 / Cr 1.5  Follow     Obesity (BMI 30-39.9)- (present on admission)   Assessment & Plan    BMI 39.2     Anticoagulated on Coumadin- (present on admission)   Assessment & Plan    Vitamin K prior to arrival  INR 1.67  KCentra in ER ~ 25 units /kg  TEG normalized      Closed fracture of one rib of left side with routine healing- (present on admission)   Assessment & Plan    Single left rib fracture   Aggressive multimodal pain management and pulmonary hygiene.   Serial chest radiographs.      Essential hypertension- (present on admission)   Assessment & Plan    Chronic condition treated with Metoprolol.  Resume maintenance medication when appropriate      Other forms of systemic lupus erythematosus (HCC)- (present on admission)   Assessment & Plan    Plaquenil   No steroids > 10 yrs  Chronic kidney disease , creatinine generally in the 1.4-1.5 range     Other specified hypothyroidism- (present on admission)   Assessment & Plan    Chronic condition treated with levothyroxine   Resume maintenance medication when appropriate      Stroke (HCC)- (present on admission)   Assessment & Plan    Premorbid  On coumadin for prevention     Trauma- (present on admission)   Assessment & Plan    Tripped over cat 4 days PTA - struck left side on couch   Seen at [facility] on 12/21  Trauma Yellow Transfer Activation.  Shawn Garcia MD. Trauma Surgery.         Discussed patient condition with RN, RT, Pharmacy and Patient.  CRITICAL CARE TIME EXCLUDING PROCEDURES: 41    Minutes  I independently reviewed pertinent clinical lab tests from the last 48 hours and ordered additional follow up clinical lab tests.  I independently reviewed pertinent radiographic images and the radiologist's reports from the last  48 hours and ordered additional follow up radiographic studies.  I reviewed the details of the available patient records and documentation by consulting physicians in [facility] up to today, summated the information, and utilized the information as warranted in today's medical decision making for this patient.  I personally evaluated the patient condition at bedside and discussed the daily plan(s) with available nursing staff, dieticians, social workers, pharmacists on rounds.  I am actively managing this patient based on my personal bedside evaluation of this patient's radiographic, and laboratory findings and clinical changes throughout the day.  Clinically unstable requiring ongoing frequent reevaluation in ICU with potential for acute deterioration

## 2018-12-22 NOTE — PROGRESS NOTES
Pt transferred from S-108 to S-101. Bedside report received from Lakisha CARPIO, all questions answered. Pt transferred via hospital bed with RN and CNA. All belongings taken, medication on chart. Pt educated on POC and goals, no further needs at this time. Family now at the bedside.

## 2018-12-22 NOTE — CARE PLAN
Problem: Safety  Goal: Will remain free from falls  Outcome: PROGRESSING AS EXPECTED  Bed rails up x3, bed alarm on, call light with in reach, frequent rounding in place      Problem: Knowledge Deficit  Goal: Knowledge of disease process/condition, treatment plan, diagnostic tests, and medications will improve  Outcome: PROGRESSING AS EXPECTED  Pt updated on POC and reasoning behind therapies and interventions. Pt understands and encouraged to ask questions

## 2018-12-22 NOTE — PROGRESS NOTES
2 RN skin check.    Pt has:    -left flank hematoma/purple ecchymosis  -redness/rash under breasts, bilaterally. Barrier paste applied.  -redness/rash underneath panus/abdomen  -dry cracking/edematous feet, with small red blisters/red marks bilaterally.    mepilex in place.

## 2018-12-22 NOTE — ED NOTES
Pt four days ago tripped over her cat landing on a chair on her left side. Came in today for worsening abd pain. Pt found to have a spleen laceration. Transferred here for further care. Pt arrives slightly hypotensive. PIV established and fluids infusing. Pt AOx4. GCS 15. Pt incontinent of urine.   Labs drawn and sent to lab.   Pt to room blur 14 and taken off board.

## 2018-12-23 ENCOUNTER — APPOINTMENT (OUTPATIENT)
Dept: RADIOLOGY | Facility: MEDICAL CENTER | Age: 63
DRG: 814 | End: 2018-12-23
Attending: SURGERY
Payer: COMMERCIAL

## 2018-12-23 PROBLEM — D62 ACUTE BLOOD LOSS ANEMIA: Status: ACTIVE | Noted: 2018-12-23

## 2018-12-23 LAB
ALBUMIN SERPL BCP-MCNC: 3.1 G/DL (ref 3.2–4.9)
ALBUMIN/GLOB SERPL: 1.4 G/DL
ALP SERPL-CCNC: 51 U/L (ref 30–99)
ALT SERPL-CCNC: 9 U/L (ref 2–50)
ANION GAP SERPL CALC-SCNC: 5 MMOL/L (ref 0–11.9)
AST SERPL-CCNC: 20 U/L (ref 12–45)
BASOPHILS # BLD AUTO: 0.3 % (ref 0–1.8)
BASOPHILS # BLD: 0.02 K/UL (ref 0–0.12)
BILIRUB SERPL-MCNC: 1.2 MG/DL (ref 0.1–1.5)
BUN SERPL-MCNC: 32 MG/DL (ref 8–22)
CALCIUM SERPL-MCNC: 9.3 MG/DL (ref 8.5–10.5)
CHLORIDE SERPL-SCNC: 109 MMOL/L (ref 96–112)
CO2 SERPL-SCNC: 24 MMOL/L (ref 20–33)
CREAT SERPL-MCNC: 1.23 MG/DL (ref 0.5–1.4)
EOSINOPHIL # BLD AUTO: 0.11 K/UL (ref 0–0.51)
EOSINOPHIL NFR BLD: 1.7 % (ref 0–6.9)
ERYTHROCYTE [DISTWIDTH] IN BLOOD BY AUTOMATED COUNT: 56.6 FL (ref 35.9–50)
ERYTHROCYTE [DISTWIDTH] IN BLOOD BY AUTOMATED COUNT: 60.3 FL (ref 35.9–50)
GLOBULIN SER CALC-MCNC: 2.2 G/DL (ref 1.9–3.5)
GLUCOSE SERPL-MCNC: 95 MG/DL (ref 65–99)
HCT VFR BLD AUTO: 21.9 % (ref 37–47)
HCT VFR BLD AUTO: 25.3 % (ref 37–47)
HGB BLD-MCNC: 7.2 G/DL (ref 12–16)
HGB BLD-MCNC: 8 G/DL (ref 12–16)
IMM GRANULOCYTES # BLD AUTO: 0.06 K/UL (ref 0–0.11)
IMM GRANULOCYTES NFR BLD AUTO: 0.9 % (ref 0–0.9)
LYMPHOCYTES # BLD AUTO: 1.33 K/UL (ref 1–4.8)
LYMPHOCYTES NFR BLD: 20.2 % (ref 22–41)
MCH RBC QN AUTO: 33.8 PG (ref 27–33)
MCH RBC QN AUTO: 34.2 PG (ref 27–33)
MCHC RBC AUTO-ENTMCNC: 31.6 G/DL (ref 33.6–35)
MCHC RBC AUTO-ENTMCNC: 32.9 G/DL (ref 33.6–35)
MCV RBC AUTO: 102.8 FL (ref 81.4–97.8)
MCV RBC AUTO: 108.1 FL (ref 81.4–97.8)
MONOCYTES # BLD AUTO: 0.73 K/UL (ref 0–0.85)
MONOCYTES NFR BLD AUTO: 11.1 % (ref 0–13.4)
NEUTROPHILS # BLD AUTO: 4.35 K/UL (ref 2–7.15)
NEUTROPHILS NFR BLD: 65.8 % (ref 44–72)
NRBC # BLD AUTO: 0.03 K/UL
NRBC BLD-RTO: 0.5 /100 WBC
PLATELET # BLD AUTO: 70 K/UL (ref 164–446)
PLATELET # BLD AUTO: 73 K/UL (ref 164–446)
PMV BLD AUTO: 12.5 FL (ref 9–12.9)
PMV BLD AUTO: 12.7 FL (ref 9–12.9)
POTASSIUM SERPL-SCNC: 4.4 MMOL/L (ref 3.6–5.5)
PROT SERPL-MCNC: 5.3 G/DL (ref 6–8.2)
RBC # BLD AUTO: 2.13 M/UL (ref 4.2–5.4)
RBC # BLD AUTO: 2.34 M/UL (ref 4.2–5.4)
SODIUM SERPL-SCNC: 138 MMOL/L (ref 135–145)
WBC # BLD AUTO: 6.6 K/UL (ref 4.8–10.8)
WBC # BLD AUTO: 8.8 K/UL (ref 4.8–10.8)

## 2018-12-23 PROCEDURE — 99291 CRITICAL CARE FIRST HOUR: CPT | Performed by: SURGERY

## 2018-12-23 PROCEDURE — 71045 X-RAY EXAM CHEST 1 VIEW: CPT

## 2018-12-23 PROCEDURE — 85027 COMPLETE CBC AUTOMATED: CPT

## 2018-12-23 PROCEDURE — 700102 HCHG RX REV CODE 250 W/ 637 OVERRIDE(OP): Performed by: SURGERY

## 2018-12-23 PROCEDURE — 85025 COMPLETE CBC W/AUTO DIFF WBC: CPT

## 2018-12-23 PROCEDURE — 80053 COMPREHEN METABOLIC PANEL: CPT

## 2018-12-23 PROCEDURE — A9270 NON-COVERED ITEM OR SERVICE: HCPCS | Performed by: SURGERY

## 2018-12-23 PROCEDURE — 770022 HCHG ROOM/CARE - ICU (200)

## 2018-12-23 PROCEDURE — 700112 HCHG RX REV CODE 229: Performed by: SURGERY

## 2018-12-23 RX ORDER — NYSTATIN 100000 [USP'U]/G
POWDER TOPICAL 2 TIMES DAILY
Status: DISCONTINUED | OUTPATIENT
Start: 2018-12-23 | End: 2018-12-26 | Stop reason: HOSPADM

## 2018-12-23 RX ADMIN — NYSTATIN: 100000 POWDER TOPICAL at 14:00

## 2018-12-23 RX ADMIN — NYSTATIN: 100000 POWDER TOPICAL at 17:26

## 2018-12-23 RX ADMIN — DOCUSATE SODIUM 100 MG: 100 CAPSULE, LIQUID FILLED ORAL at 17:26

## 2018-12-23 RX ADMIN — ACETAMINOPHEN 1000 MG: 500 TABLET ORAL at 18:00

## 2018-12-23 ASSESSMENT — PATIENT HEALTH QUESTIONNAIRE - PHQ9
2. FEELING DOWN, DEPRESSED, IRRITABLE, OR HOPELESS: NOT AT ALL
SUM OF ALL RESPONSES TO PHQ9 QUESTIONS 1 AND 2: 0
1. LITTLE INTEREST OR PLEASURE IN DOING THINGS: NOT AT ALL

## 2018-12-23 ASSESSMENT — ENCOUNTER SYMPTOMS
SHORTNESS OF BREATH: 0
ABDOMINAL PAIN: 0

## 2018-12-23 ASSESSMENT — PAIN SCALES - GENERAL
PAINLEVEL_OUTOF10: 0

## 2018-12-23 ASSESSMENT — COGNITIVE AND FUNCTIONAL STATUS - GENERAL
SUGGESTED CMS G CODE MODIFIER MOBILITY: CK
SUGGESTED CMS G CODE MODIFIER DAILY ACTIVITY: CJ
STANDING UP FROM CHAIR USING ARMS: A LITTLE
MOVING TO AND FROM BED TO CHAIR: A LITTLE
CLIMB 3 TO 5 STEPS WITH RAILING: A LITTLE
DRESSING REGULAR LOWER BODY CLOTHING: A LITTLE
MOVING FROM LYING ON BACK TO SITTING ON SIDE OF FLAT BED: A LITTLE
MOBILITY SCORE: 18
WALKING IN HOSPITAL ROOM: A LITTLE
TOILETING: A LITTLE
TURNING FROM BACK TO SIDE WHILE IN FLAT BAD: A LITTLE
DAILY ACTIVITIY SCORE: 22

## 2018-12-23 ASSESSMENT — LIFESTYLE VARIABLES
HOW MANY TIMES IN THE PAST YEAR HAVE YOU HAD 5 OR MORE DRINKS IN A DAY: 0
ALCOHOL_USE: YES
HAVE YOU EVER FELT YOU SHOULD CUT DOWN ON YOUR DRINKING: NO
HAVE PEOPLE ANNOYED YOU BY CRITICIZING YOUR DRINKING: NO
TOTAL SCORE: 0
AVERAGE NUMBER OF DAYS PER WEEK YOU HAVE A DRINK CONTAINING ALCOHOL: 0
EVER FELT BAD OR GUILTY ABOUT YOUR DRINKING: NO
TOTAL SCORE: 0
ON A TYPICAL DAY WHEN YOU DRINK ALCOHOL HOW MANY DRINKS DO YOU HAVE: 0
TOTAL SCORE: 0
CONSUMPTION TOTAL: NEGATIVE
EVER HAD A DRINK FIRST THING IN THE MORNING TO STEADY YOUR NERVES TO GET RID OF A HANGOVER: NO

## 2018-12-23 NOTE — PROGRESS NOTES
Trauma / Surgical Daily Progress Note    Date of Service  12/23/2018    Chief Complaint  63 y.o. female admitted 12/21/2018 with Trauma    Interval Events  Transfused for low hbg  Coumadin on hold  Lupus anticoagulant  Ambulate  To bid hct  Discontinue efraín    Review of Systems  Review of Systems   Respiratory: Negative for shortness of breath.    Cardiovascular: Negative for chest pain.   Gastrointestinal: Negative for abdominal pain.        Vital Signs for last 24 hours  Temp:  [36.7 °C (98 °F)-37.3 °C (99.1 °F)] 36.8 °C (98.2 °F)  Pulse:  [70-84] 73  Resp:  [13-30] 25  BP: (126-134)/(59-60) 128/60    Hemodynamic parameters for last 24 hours       Respiratory Data     Respiration: (!) 25, Pulse Oximetry: 97 %        RUL Breath Sounds: Clear, RML Breath Sounds: Clear, RLL Breath Sounds: Diminished, GUERITA Breath Sounds: Clear, LLL Breath Sounds: Diminished    Physical Exam  Physical Exam   Constitutional: She is oriented to person, place, and time.   HENT:   Head: Normocephalic.   Eyes: Pupils are equal, round, and reactive to light.   Cardiovascular: Regular rhythm.    Pulmonary/Chest: No respiratory distress.   Abdominal: Soft. She exhibits no distension. There is no tenderness.   Neurological: She is oriented to person, place, and time.   Skin: Skin is warm and dry. She is not diaphoretic.   Psychiatric: She has a normal mood and affect.       Laboratory  Recent Results (from the past 24 hour(s))   Hemoglobin - Q6 hours x4    Collection Time: 12/22/18  3:30 PM   Result Value Ref Range    Hemoglobin 7.0 (L) 12.0 - 16.0 g/dL   CBC with Differential: Tomorrow AM    Collection Time: 12/23/18  4:09 AM   Result Value Ref Range    WBC 6.6 4.8 - 10.8 K/uL    RBC 2.13 (L) 4.20 - 5.40 M/uL    Hemoglobin 7.2 (L) 12.0 - 16.0 g/dL    Hematocrit 21.9 (L) 37.0 - 47.0 %    .8 (H) 81.4 - 97.8 fL    MCH 33.8 (H) 27.0 - 33.0 pg    MCHC 32.9 (L) 33.6 - 35.0 g/dL    RDW 56.6 (H) 35.9 - 50.0 fL    Platelet Count 73 (L) 164 - 446  K/uL    MPV 12.7 9.0 - 12.9 fL    Neutrophils-Polys 65.80 44.00 - 72.00 %    Lymphocytes 20.20 (L) 22.00 - 41.00 %    Monocytes 11.10 0.00 - 13.40 %    Eosinophils 1.70 0.00 - 6.90 %    Basophils 0.30 0.00 - 1.80 %    Immature Granulocytes 0.90 0.00 - 0.90 %    Nucleated RBC 0.50 /100 WBC    Neutrophils (Absolute) 4.35 2.00 - 7.15 K/uL    Lymphs (Absolute) 1.33 1.00 - 4.80 K/uL    Monos (Absolute) 0.73 0.00 - 0.85 K/uL    Eos (Absolute) 0.11 0.00 - 0.51 K/uL    Baso (Absolute) 0.02 0.00 - 0.12 K/uL    Immature Granulocytes (abs) 0.06 0.00 - 0.11 K/uL    NRBC (Absolute) 0.03 K/uL   Comp Metabolic Panel (CMP): Tomorrow AM    Collection Time: 12/23/18  4:09 AM   Result Value Ref Range    Sodium 138 135 - 145 mmol/L    Potassium 4.4 3.6 - 5.5 mmol/L    Chloride 109 96 - 112 mmol/L    Co2 24 20 - 33 mmol/L    Anion Gap 5.0 0.0 - 11.9    Glucose 95 65 - 99 mg/dL    Bun 32 (H) 8 - 22 mg/dL    Creatinine 1.23 0.50 - 1.40 mg/dL    Calcium 9.3 8.5 - 10.5 mg/dL    AST(SGOT) 20 12 - 45 U/L    ALT(SGPT) 9 2 - 50 U/L    Alkaline Phosphatase 51 30 - 99 U/L    Total Bilirubin 1.2 0.1 - 1.5 mg/dL    Albumin 3.1 (L) 3.2 - 4.9 g/dL    Total Protein 5.3 (L) 6.0 - 8.2 g/dL    Globulin 2.2 1.9 - 3.5 g/dL    A-G Ratio 1.4 g/dL   ESTIMATED GFR    Collection Time: 12/23/18  4:09 AM   Result Value Ref Range    GFR If  53 (A) >60 mL/min/1.73 m 2    GFR If Non  44 (A) >60 mL/min/1.73 m 2       Fluids    Intake/Output Summary (Last 24 hours) at 12/23/18 1123  Last data filed at 12/23/18 1000   Gross per 24 hour   Intake           4167.5 ml   Output             1700 ml   Net           2467.5 ml       Core Measures & Quality Metrics  Labs reviewed, Medications reviewed and Radiology images reviewed  Alcazar catheter: No Alcazar      DVT Prophylaxis: Contraindicated - Anemia requiring blood transfusion  DVT prophylaxis - mechanical: SCDs  Ulcer prophylaxis: Not indicated        CHIARA Score  ETOH  Screening    Assessment/Plan  Spleen injury- (present on admission)   Assessment & Plan    Large amount of left upper quadrant perisplenic acute hemorrhage.   No contrast given  Serial hemoglobin , significant drift, hemodynamic stability  Serial abdominal exams, no peritonitis     Thrombocytopenia (HCC)- (present on admission)   Assessment & Plan    Platelet count 111  Follow       Dehydration, moderate- (present on admission)   Assessment & Plan    Dry mucus membranes  BUN 42 / Cr 1.5  Follow     Obesity (BMI 30-39.9)- (present on admission)   Assessment & Plan    BMI 39.2     Anticoagulated on Coumadin- (present on admission)   Assessment & Plan    Vitamin K prior to arrival  INR 1.67  KCentra in ER ~ 25 units /kg  TEG normalized      Closed fracture of one rib of left side with routine healing- (present on admission)   Assessment & Plan    Single left rib fracture   Aggressive multimodal pain management and pulmonary hygiene.   Serial chest radiographs.      Essential hypertension- (present on admission)   Assessment & Plan    Chronic condition treated with Metoprolol.  Resume maintenance medication when appropriate      Other forms of systemic lupus erythematosus (HCC)- (present on admission)   Assessment & Plan    Plaquenil   No steroids > 10 yrs  Chronic kidney disease , creatinine generally in the 1.4-1.5 range     Other specified hypothyroidism- (present on admission)   Assessment & Plan    Chronic condition treated with levothyroxine   Resume maintenance medication when appropriate      Stroke (HCC)- (present on admission)   Assessment & Plan    Premorbid  On coumadin for prevention     Trauma- (present on admission)   Assessment & Plan    Tripped over cat 4 days PTA - struck left side on couch   Seen at [facility] on 12/21  Trauma Yellow Transfer Activation.  Shawn Garcia MD. Trauma Surgery.         Discussed patient condition with RN, RT and Pharmacy.  CRITICAL CARE TIME EXCLUDING PROCEDURES: 35     Minutes.  Managing blood loss anemia requiring transfusion.  Continued risk for hemorrhage.  The very real possibilty of a deterioration of this patient's condition required the highest level of my preparedness for sudden, emergent intervention. I provided critical care services, which included medication orders, frequent reevaluations of the patient's condition and response to treatment, ordering and reviewing test results. The critical care time associated with the care of the patient was 35 minutes. Review chart for interventions. This time is exclusive of any other billable procedures.

## 2018-12-23 NOTE — PROGRESS NOTES
2 RN skin check complete.    Areas of note:    -left flank hematoma/purple ecchymosis  -redness/rash under breasts, bilaterally. Barrier paste applied.  -redness/rash underneath panus/abdomen. Barrier wiped and interdry placed.  -dry cracking/edematous feet, with small red blisters/red marks bilaterally.     mepilex in place.

## 2018-12-23 NOTE — PROGRESS NOTES
2 RN skin check   large ecchymosis left flank/abdomen  Redness/excoriation under bilateral breasts, abd pannus, interdry paced  Cracking/dry heels and blanching redness bilaterally on feet/bony prominences on feet (pt reports from her shoes)

## 2018-12-23 NOTE — CARE PLAN
Problem: Knowledge Deficit  Goal: Knowledge of disease process/condition, treatment plan, diagnostic tests, and medications will improve  Reviewed POC with pt, questions and concerns addressed    Problem: Mobility  Goal: Risk for activity intolerance will decrease  Assist pt to mobilize up to EOB and chair,

## 2018-12-24 ENCOUNTER — APPOINTMENT (OUTPATIENT)
Dept: RADIOLOGY | Facility: MEDICAL CENTER | Age: 63
DRG: 814 | End: 2018-12-24
Attending: SURGERY
Payer: COMMERCIAL

## 2018-12-24 PROBLEM — Z53.09 CONTRAINDICATION TO DEEP VEIN THROMBOSIS (DVT) PROPHYLAXIS: Status: ACTIVE | Noted: 2018-12-24

## 2018-12-24 LAB
ALBUMIN SERPL BCP-MCNC: 3.4 G/DL (ref 3.2–4.9)
ALBUMIN/GLOB SERPL: 1.5 G/DL
ALP SERPL-CCNC: 54 U/L (ref 30–99)
ALT SERPL-CCNC: 8 U/L (ref 2–50)
ANION GAP SERPL CALC-SCNC: 6 MMOL/L (ref 0–11.9)
AST SERPL-CCNC: 25 U/L (ref 12–45)
BASOPHILS # BLD AUTO: 0.4 % (ref 0–1.8)
BASOPHILS # BLD: 0.02 K/UL (ref 0–0.12)
BILIRUB SERPL-MCNC: 1.2 MG/DL (ref 0.1–1.5)
BUN SERPL-MCNC: 24 MG/DL (ref 8–22)
CALCIUM SERPL-MCNC: 9.2 MG/DL (ref 8.5–10.5)
CHLORIDE SERPL-SCNC: 109 MMOL/L (ref 96–112)
CO2 SERPL-SCNC: 22 MMOL/L (ref 20–33)
CREAT SERPL-MCNC: 1.05 MG/DL (ref 0.5–1.4)
EOSINOPHIL # BLD AUTO: 0.13 K/UL (ref 0–0.51)
EOSINOPHIL NFR BLD: 2.4 % (ref 0–6.9)
ERYTHROCYTE [DISTWIDTH] IN BLOOD BY AUTOMATED COUNT: 55.8 FL (ref 35.9–50)
ERYTHROCYTE [DISTWIDTH] IN BLOOD BY AUTOMATED COUNT: 57.2 FL (ref 35.9–50)
GLOBULIN SER CALC-MCNC: 2.3 G/DL (ref 1.9–3.5)
GLUCOSE SERPL-MCNC: 98 MG/DL (ref 65–99)
HCT VFR BLD AUTO: 21.9 % (ref 37–47)
HCT VFR BLD AUTO: 24.6 % (ref 37–47)
HGB BLD-MCNC: 7.1 G/DL (ref 12–16)
HGB BLD-MCNC: 7.8 G/DL (ref 12–16)
IMM GRANULOCYTES # BLD AUTO: 0.06 K/UL (ref 0–0.11)
IMM GRANULOCYTES NFR BLD AUTO: 1.1 % (ref 0–0.9)
LYMPHOCYTES # BLD AUTO: 0.97 K/UL (ref 1–4.8)
LYMPHOCYTES NFR BLD: 17.7 % (ref 22–41)
MAGNESIUM SERPL-MCNC: 2.1 MG/DL (ref 1.5–2.5)
MCH RBC QN AUTO: 33.2 PG (ref 27–33)
MCH RBC QN AUTO: 33.5 PG (ref 27–33)
MCHC RBC AUTO-ENTMCNC: 31.7 G/DL (ref 33.6–35)
MCHC RBC AUTO-ENTMCNC: 32.4 G/DL (ref 33.6–35)
MCV RBC AUTO: 103.3 FL (ref 81.4–97.8)
MCV RBC AUTO: 104.7 FL (ref 81.4–97.8)
MONOCYTES # BLD AUTO: 0.62 K/UL (ref 0–0.85)
MONOCYTES NFR BLD AUTO: 11.3 % (ref 0–13.4)
NEUTROPHILS # BLD AUTO: 3.67 K/UL (ref 2–7.15)
NEUTROPHILS NFR BLD: 67.1 % (ref 44–72)
NRBC # BLD AUTO: 0.04 K/UL
NRBC BLD-RTO: 0.7 /100 WBC
PHOSPHATE SERPL-MCNC: 2.4 MG/DL (ref 2.5–4.5)
PLATELET # BLD AUTO: 73 K/UL (ref 164–446)
PLATELET # BLD AUTO: 88 K/UL (ref 164–446)
PMV BLD AUTO: 12.1 FL (ref 9–12.9)
PMV BLD AUTO: 12.3 FL (ref 9–12.9)
POTASSIUM SERPL-SCNC: 4.3 MMOL/L (ref 3.6–5.5)
PROT SERPL-MCNC: 5.7 G/DL (ref 6–8.2)
RBC # BLD AUTO: 2.12 M/UL (ref 4.2–5.4)
RBC # BLD AUTO: 2.35 M/UL (ref 4.2–5.4)
SODIUM SERPL-SCNC: 137 MMOL/L (ref 135–145)
WBC # BLD AUTO: 5.5 K/UL (ref 4.8–10.8)
WBC # BLD AUTO: 6.3 K/UL (ref 4.8–10.8)

## 2018-12-24 PROCEDURE — 71045 X-RAY EXAM CHEST 1 VIEW: CPT

## 2018-12-24 PROCEDURE — 84100 ASSAY OF PHOSPHORUS: CPT

## 2018-12-24 PROCEDURE — 99233 SBSQ HOSP IP/OBS HIGH 50: CPT | Performed by: SURGERY

## 2018-12-24 PROCEDURE — 770001 HCHG ROOM/CARE - MED/SURG/GYN PRIV*

## 2018-12-24 PROCEDURE — 700112 HCHG RX REV CODE 229: Performed by: SURGERY

## 2018-12-24 PROCEDURE — A9270 NON-COVERED ITEM OR SERVICE: HCPCS | Performed by: SURGERY

## 2018-12-24 PROCEDURE — 80053 COMPREHEN METABOLIC PANEL: CPT

## 2018-12-24 PROCEDURE — 85027 COMPLETE CBC AUTOMATED: CPT

## 2018-12-24 PROCEDURE — 83735 ASSAY OF MAGNESIUM: CPT

## 2018-12-24 PROCEDURE — 700102 HCHG RX REV CODE 250 W/ 637 OVERRIDE(OP): Performed by: SURGERY

## 2018-12-24 PROCEDURE — 85025 COMPLETE CBC W/AUTO DIFF WBC: CPT

## 2018-12-24 RX ORDER — ATENOLOL 50 MG/1
50 TABLET ORAL EVERY EVENING
Status: DISCONTINUED | OUTPATIENT
Start: 2018-12-25 | End: 2018-12-26 | Stop reason: HOSPADM

## 2018-12-24 RX ORDER — TORSEMIDE 20 MG/1
40 TABLET ORAL DAILY
Status: DISCONTINUED | OUTPATIENT
Start: 2018-12-24 | End: 2018-12-24

## 2018-12-24 RX ORDER — HYDROXYCHLOROQUINE SULFATE 200 MG/1
200 TABLET, FILM COATED ORAL 2 TIMES DAILY
Status: DISCONTINUED | OUTPATIENT
Start: 2018-12-24 | End: 2018-12-26 | Stop reason: HOSPADM

## 2018-12-24 RX ORDER — OXYCODONE HYDROCHLORIDE 10 MG/1
10 TABLET ORAL
Status: DISCONTINUED | OUTPATIENT
Start: 2018-12-24 | End: 2018-12-24

## 2018-12-24 RX ORDER — SPIRONOLACTONE 50 MG/1
50 TABLET, FILM COATED ORAL DAILY
Status: DISCONTINUED | OUTPATIENT
Start: 2018-12-24 | End: 2018-12-25

## 2018-12-24 RX ORDER — LEVOTHYROXINE SODIUM 0.05 MG/1
100 TABLET ORAL DAILY
Status: DISCONTINUED | OUTPATIENT
Start: 2018-12-24 | End: 2018-12-26 | Stop reason: HOSPADM

## 2018-12-24 RX ORDER — ATENOLOL 50 MG/1
50 TABLET ORAL DAILY
Status: DISCONTINUED | OUTPATIENT
Start: 2018-12-24 | End: 2018-12-24

## 2018-12-24 RX ORDER — OXYCODONE HYDROCHLORIDE 5 MG/1
5 TABLET ORAL
Status: DISCONTINUED | OUTPATIENT
Start: 2018-12-24 | End: 2018-12-24

## 2018-12-24 RX ORDER — OXYCODONE HYDROCHLORIDE 5 MG/1
5-10 TABLET ORAL
Status: DISCONTINUED | OUTPATIENT
Start: 2018-12-24 | End: 2018-12-26

## 2018-12-24 RX ORDER — ALLOPURINOL 300 MG/1
300 TABLET ORAL DAILY
Status: DISCONTINUED | OUTPATIENT
Start: 2018-12-24 | End: 2018-12-26 | Stop reason: HOSPADM

## 2018-12-24 RX ADMIN — ACETAMINOPHEN 1000 MG: 500 TABLET ORAL at 17:55

## 2018-12-24 RX ADMIN — ATENOLOL 50 MG: 50 TABLET ORAL at 12:34

## 2018-12-24 RX ADMIN — DIBASIC SODIUM PHOSPHATE, MONOBASIC POTASSIUM PHOSPHATE AND MONOBASIC SODIUM PHOSPHATE 2 TABLET: 852; 155; 130 TABLET ORAL at 10:07

## 2018-12-24 RX ADMIN — NYSTATIN: 100000 POWDER TOPICAL at 05:22

## 2018-12-24 RX ADMIN — DOCUSATE SODIUM 100 MG: 100 CAPSULE, LIQUID FILLED ORAL at 17:54

## 2018-12-24 RX ADMIN — ALLOPURINOL 300 MG: 300 TABLET ORAL at 12:34

## 2018-12-24 RX ADMIN — ACETAMINOPHEN 1000 MG: 500 TABLET ORAL at 12:34

## 2018-12-24 RX ADMIN — ACETAMINOPHEN 1000 MG: 500 TABLET ORAL at 23:56

## 2018-12-24 RX ADMIN — DIBASIC SODIUM PHOSPHATE, MONOBASIC POTASSIUM PHOSPHATE AND MONOBASIC SODIUM PHOSPHATE 2 TABLET: 852; 155; 130 TABLET ORAL at 23:56

## 2018-12-24 RX ADMIN — LEVOTHYROXINE SODIUM 100 MCG: 0.05 TABLET ORAL at 12:34

## 2018-12-24 RX ADMIN — TORSEMIDE 40 MG: 20 TABLET ORAL at 12:33

## 2018-12-24 RX ADMIN — HYDROXYCHLOROQUINE SULFATE 200 MG: 200 TABLET, FILM COATED ORAL at 17:55

## 2018-12-24 RX ADMIN — SPIRONOLACTONE 50 MG: 50 TABLET ORAL at 12:34

## 2018-12-24 RX ADMIN — HYDROXYCHLOROQUINE SULFATE 200 MG: 200 TABLET, FILM COATED ORAL at 12:34

## 2018-12-24 RX ADMIN — POLYETHYLENE GLYCOL 3350 1 PACKET: 17 POWDER, FOR SOLUTION ORAL at 17:55

## 2018-12-24 ASSESSMENT — PAIN SCALES - GENERAL
PAINLEVEL_OUTOF10: 0

## 2018-12-24 NOTE — ASSESSMENT & PLAN NOTE
12/23 Transfused 1 unit of PRBC's  12/25 Transfusion not required.   Iron replacement per pharmacy protocol

## 2018-12-24 NOTE — PROGRESS NOTES
Trauma / Surgical Daily Progress Note    Date of Service  12/24/2018    Chief Complaint  63 y.o. female admitted 12/21/2018 with Trauma    Interval Events  Marginal HBG. At 7  Hep lock  Tolerating diet  Repeat hbg in 4 hours and transfer if stable.     Review of Systems  Review of Systems     Vital Signs for last 24 hours  Temp:  [36.4 °C (97.6 °F)-37.4 °C (99.4 °F)] 37.1 °C (98.8 °F)  Pulse:  [66-87] 77  Resp:  [13-34] 18    Hemodynamic parameters for last 24 hours       Respiratory Data     Respiration: 18, Pulse Oximetry: 93 %        RUL Breath Sounds: Clear, RML Breath Sounds: Clear, RLL Breath Sounds: Diminished, GUERITA Breath Sounds: Clear, LLL Breath Sounds: Diminished    Physical Exam  Physical Exam   Constitutional: She is oriented to person, place, and time.   HENT:   Head: Normocephalic.   Eyes: Pupils are equal, round, and reactive to light.   Cardiovascular: Regular rhythm.    Pulmonary/Chest: No respiratory distress.   Abdominal: Soft. She exhibits no distension. There is no tenderness.   Neurological: She is oriented to person, place, and time.   Skin: Skin is warm and dry. She is not diaphoretic.   Psychiatric: She has a normal mood and affect.       Laboratory  Recent Results (from the past 24 hour(s))   CBC WITHOUT DIFFERENTIAL    Collection Time: 12/23/18  5:18 PM   Result Value Ref Range    WBC 8.8 4.8 - 10.8 K/uL    RBC 2.34 (L) 4.20 - 5.40 M/uL    Hemoglobin 8.0 (L) 12.0 - 16.0 g/dL    Hematocrit 25.3 (L) 37.0 - 47.0 %    .1 (H) 81.4 - 97.8 fL    MCH 34.2 (H) 27.0 - 33.0 pg    MCHC 31.6 (L) 33.6 - 35.0 g/dL    RDW 60.3 (H) 35.9 - 50.0 fL    Platelet Count 70 (L) 164 - 446 K/uL    MPV 12.5 9.0 - 12.9 fL   CBC with Differential: Tomorrow AM    Collection Time: 12/24/18  4:25 AM   Result Value Ref Range    WBC 5.5 4.8 - 10.8 K/uL    RBC 2.12 (L) 4.20 - 5.40 M/uL    Hemoglobin 7.1 (L) 12.0 - 16.0 g/dL    Hematocrit 21.9 (L) 37.0 - 47.0 %    .3 (H) 81.4 - 97.8 fL    MCH 33.5 (H) 27.0 -  33.0 pg    MCHC 32.4 (L) 33.6 - 35.0 g/dL    RDW 55.8 (H) 35.9 - 50.0 fL    Platelet Count 73 (L) 164 - 446 K/uL    MPV 12.3 9.0 - 12.9 fL    Neutrophils-Polys 67.10 44.00 - 72.00 %    Lymphocytes 17.70 (L) 22.00 - 41.00 %    Monocytes 11.30 0.00 - 13.40 %    Eosinophils 2.40 0.00 - 6.90 %    Basophils 0.40 0.00 - 1.80 %    Immature Granulocytes 1.10 (H) 0.00 - 0.90 %    Nucleated RBC 0.70 /100 WBC    Neutrophils (Absolute) 3.67 2.00 - 7.15 K/uL    Lymphs (Absolute) 0.97 (L) 1.00 - 4.80 K/uL    Monos (Absolute) 0.62 0.00 - 0.85 K/uL    Eos (Absolute) 0.13 0.00 - 0.51 K/uL    Baso (Absolute) 0.02 0.00 - 0.12 K/uL    Immature Granulocytes (abs) 0.06 0.00 - 0.11 K/uL    NRBC (Absolute) 0.04 K/uL   Comp Metabolic Panel (CMP): Tomorrow AM    Collection Time: 12/24/18  4:25 AM   Result Value Ref Range    Sodium 137 135 - 145 mmol/L    Potassium 4.3 3.6 - 5.5 mmol/L    Chloride 109 96 - 112 mmol/L    Co2 22 20 - 33 mmol/L    Anion Gap 6.0 0.0 - 11.9    Glucose 98 65 - 99 mg/dL    Bun 24 (H) 8 - 22 mg/dL    Creatinine 1.05 0.50 - 1.40 mg/dL    Calcium 9.2 8.5 - 10.5 mg/dL    AST(SGOT) 25 12 - 45 U/L    ALT(SGPT) 8 2 - 50 U/L    Alkaline Phosphatase 54 30 - 99 U/L    Total Bilirubin 1.2 0.1 - 1.5 mg/dL    Albumin 3.4 3.2 - 4.9 g/dL    Total Protein 5.7 (L) 6.0 - 8.2 g/dL    Globulin 2.3 1.9 - 3.5 g/dL    A-G Ratio 1.5 g/dL   MAGNESIUM    Collection Time: 12/24/18  4:25 AM   Result Value Ref Range    Magnesium 2.1 1.5 - 2.5 mg/dL   PHOSPHORUS    Collection Time: 12/24/18  4:25 AM   Result Value Ref Range    Phosphorus 2.4 (L) 2.5 - 4.5 mg/dL   ESTIMATED GFR    Collection Time: 12/24/18  4:25 AM   Result Value Ref Range    GFR If African American >60 >60 mL/min/1.73 m 2    GFR If Non  53 (A) >60 mL/min/1.73 m 2       Fluids    Intake/Output Summary (Last 24 hours) at 12/24/18 1017  Last data filed at 12/24/18 0600   Gross per 24 hour   Intake              540 ml   Output              975 ml   Net              -435 ml       Core Measures & Quality Metrics  Labs reviewed, Medications reviewed and Radiology images reviewed  Alcazar catheter: No Alcazar      DVT Prophylaxis: Contraindicated - Anemia requiring blood transfusion  DVT prophylaxis - mechanical: SCDs  Ulcer prophylaxis: Not indicated        CHIARA Score  ETOH Screening    Assessment/Plan  Spleen injury- (present on admission)   Assessment & Plan    Large amount of left upper quadrant perisplenic acute hemorrhage.   No contrast given  Serial hemoglobin , significant drift, hemodynamic stability  Serial abdominal exams, no peritonitis     Thrombocytopenia (HCC)- (present on admission)   Assessment & Plan    Platelet count 111  Follow       Dehydration, moderate- (present on admission)   Assessment & Plan    Dry mucus membranes  BUN 42 / Cr 1.5  Follow     Obesity (BMI 30-39.9)- (present on admission)   Assessment & Plan    BMI 39.2     Anticoagulated on Coumadin- (present on admission)   Assessment & Plan    Vitamin K prior to arrival  INR 1.67  KCentra in ER ~ 25 units /kg  TEG normalized      Closed fracture of one rib of left side with routine healing- (present on admission)   Assessment & Plan    Single left rib fracture   Aggressive multimodal pain management and pulmonary hygiene.   Serial chest radiographs.      Essential hypertension- (present on admission)   Assessment & Plan    Chronic condition treated with Metoprolol.  Resume maintenance medication when appropriate      Other forms of systemic lupus erythematosus (HCC)- (present on admission)   Assessment & Plan    Plaquenil   No steroids > 10 yrs  Chronic kidney disease , creatinine generally in the 1.4-1.5 range     Other specified hypothyroidism- (present on admission)   Assessment & Plan    Chronic condition treated with levothyroxine   Resume maintenance medication when appropriate      Stroke (HCC)- (present on admission)   Assessment & Plan    Premorbid  On coumadin for prevention     Trauma- (present  on admission)   Assessment & Plan    Tripped over cat 4 days PTA - struck left side on couch   Seen at [facility] on 12/21  Trauma Yellow Transfer Activation.  Shawn Garcia MD. Trauma Surgery.         Discussed patient condition with RN, RT and Pharmacy.  CRITICAL CARE TIME EXCLUDING PROCEDURES: 35  Minutes  Managing blood loss anemia, spleen laceration, serial laboratory..  I independently reviewed pertinent clinical lab tests from the last 48 hours and ordered additional follow up clinical lab tests.  I reviewed pertinent radiographic images and the radiologist's reports from the last 48 hours and ordered additional follow up radiographic studies.  I reviewed the details of the available patient records and documentation by consulting physicians in [facility] up to today, summated the information, and utilized the information as warranted in today's medical decision making for this patient.  I evaluated the patient condition at bedside and discussed the daily plan(s) with available nursing staff, dieticians, social workers, pharmacists on rounds.  I am actively managing this patient based on my personal bedside evaluation of this patient's radiographic, and laboratory findings and clinical changes throughout the day.  Clinically unstable requiring ongoing frequent reevaluation in ICU with potential for acute deterioration

## 2018-12-24 NOTE — CARE PLAN
Problem: Safety  Goal: Will remain free from falls  Outcome: PROGRESSING AS EXPECTED  Pt steady on feet. Understands to ask for help when mobilizing, no reinforcement needed    Problem: Respiratory:  Goal: Respiratory status will improve  Outcome: PROGRESSING AS EXPECTED  Pt effectively TCDB. Use of IS encouraged, participating in mobility.

## 2018-12-24 NOTE — ASSESSMENT & PLAN NOTE
Pharmacological DVT prophylaxis contraindicated due to splenic injury  RAP score  12/22 Lower extremity sonogram negative for DVT   Weekly lower extremity trauma sonogram

## 2018-12-25 ENCOUNTER — APPOINTMENT (OUTPATIENT)
Dept: RADIOLOGY | Facility: MEDICAL CENTER | Age: 63
DRG: 814 | End: 2018-12-25
Attending: SURGERY
Payer: COMMERCIAL

## 2018-12-25 LAB
ABO GROUP BLD: NORMAL
ANION GAP SERPL CALC-SCNC: 9 MMOL/L (ref 0–11.9)
BASOPHILS # BLD AUTO: 0.4 % (ref 0–1.8)
BASOPHILS # BLD: 0.02 K/UL (ref 0–0.12)
BLD GP AB SCN SERPL QL: NORMAL
BUN SERPL-MCNC: 24 MG/DL (ref 8–22)
CALCIUM SERPL-MCNC: 9.6 MG/DL (ref 8.5–10.5)
CHLORIDE SERPL-SCNC: 105 MMOL/L (ref 96–112)
CO2 SERPL-SCNC: 25 MMOL/L (ref 20–33)
CREAT SERPL-MCNC: 1.19 MG/DL (ref 0.5–1.4)
EOSINOPHIL # BLD AUTO: 0.15 K/UL (ref 0–0.51)
EOSINOPHIL NFR BLD: 2.7 % (ref 0–6.9)
ERYTHROCYTE [DISTWIDTH] IN BLOOD BY AUTOMATED COUNT: 54.4 FL (ref 35.9–50)
ERYTHROCYTE [DISTWIDTH] IN BLOOD BY AUTOMATED COUNT: 55.8 FL (ref 35.9–50)
GLUCOSE SERPL-MCNC: 103 MG/DL (ref 65–99)
HCT VFR BLD AUTO: 21 % (ref 37–47)
HCT VFR BLD AUTO: 22.3 % (ref 37–47)
HGB BLD-MCNC: 6.8 G/DL (ref 12–16)
HGB BLD-MCNC: 7.2 G/DL (ref 12–16)
HGB RETIC QN AUTO: 30.7 PG/CELL (ref 29–35)
IMM GRANULOCYTES # BLD AUTO: 0.04 K/UL (ref 0–0.11)
IMM GRANULOCYTES NFR BLD AUTO: 0.7 % (ref 0–0.9)
IMM RETICS NFR: 18.4 % (ref 9.3–17.4)
IRON SATN MFR SERPL: 12 % (ref 15–55)
IRON SERPL-MCNC: 37 UG/DL (ref 40–170)
LYMPHOCYTES # BLD AUTO: 0.98 K/UL (ref 1–4.8)
LYMPHOCYTES NFR BLD: 17.9 % (ref 22–41)
MCH RBC QN AUTO: 33.3 PG (ref 27–33)
MCH RBC QN AUTO: 33.7 PG (ref 27–33)
MCHC RBC AUTO-ENTMCNC: 32.3 G/DL (ref 33.6–35)
MCHC RBC AUTO-ENTMCNC: 32.4 G/DL (ref 33.6–35)
MCV RBC AUTO: 103.2 FL (ref 81.4–97.8)
MCV RBC AUTO: 104 FL (ref 81.4–97.8)
MONOCYTES # BLD AUTO: 0.58 K/UL (ref 0–0.85)
MONOCYTES NFR BLD AUTO: 10.6 % (ref 0–13.4)
NEUTROPHILS # BLD AUTO: 3.72 K/UL (ref 2–7.15)
NEUTROPHILS NFR BLD: 67.7 % (ref 44–72)
NRBC # BLD AUTO: 0.03 K/UL
NRBC BLD-RTO: 0.5 /100 WBC
PLATELET # BLD AUTO: 84 K/UL (ref 164–446)
PLATELET # BLD AUTO: 93 K/UL (ref 164–446)
PMV BLD AUTO: 12 FL (ref 9–12.9)
PMV BLD AUTO: 12.6 FL (ref 9–12.9)
POTASSIUM SERPL-SCNC: 3.9 MMOL/L (ref 3.6–5.5)
RBC # BLD AUTO: 2.02 M/UL (ref 4.2–5.4)
RBC # BLD AUTO: 2.16 M/UL (ref 4.2–5.4)
RETICS # AUTO: 0.1 M/UL (ref 0.04–0.06)
RETICS/RBC NFR: 4.5 % (ref 0.8–2.1)
RH BLD: NORMAL
SODIUM SERPL-SCNC: 139 MMOL/L (ref 135–145)
TIBC SERPL-MCNC: 305 UG/DL (ref 250–450)
WBC # BLD AUTO: 5.1 K/UL (ref 4.8–10.8)
WBC # BLD AUTO: 5.5 K/UL (ref 4.8–10.8)

## 2018-12-25 PROCEDURE — 85025 COMPLETE CBC W/AUTO DIFF WBC: CPT

## 2018-12-25 PROCEDURE — 700102 HCHG RX REV CODE 250 W/ 637 OVERRIDE(OP): Performed by: SURGERY

## 2018-12-25 PROCEDURE — 71045 X-RAY EXAM CHEST 1 VIEW: CPT

## 2018-12-25 PROCEDURE — 700102 HCHG RX REV CODE 250 W/ 637 OVERRIDE(OP): Performed by: NURSE PRACTITIONER

## 2018-12-25 PROCEDURE — 85046 RETICYTE/HGB CONCENTRATE: CPT

## 2018-12-25 PROCEDURE — 80048 BASIC METABOLIC PNL TOTAL CA: CPT

## 2018-12-25 PROCEDURE — A6250 SKIN SEAL PROTECT MOISTURIZR: HCPCS | Performed by: SURGERY

## 2018-12-25 PROCEDURE — 85027 COMPLETE CBC AUTOMATED: CPT

## 2018-12-25 PROCEDURE — A9270 NON-COVERED ITEM OR SERVICE: HCPCS | Performed by: NURSE PRACTITIONER

## 2018-12-25 PROCEDURE — 700105 HCHG RX REV CODE 258: Performed by: NURSE PRACTITIONER

## 2018-12-25 PROCEDURE — 700105 HCHG RX REV CODE 258

## 2018-12-25 PROCEDURE — 700112 HCHG RX REV CODE 229: Performed by: SURGERY

## 2018-12-25 PROCEDURE — 700111 HCHG RX REV CODE 636 W/ 250 OVERRIDE (IP): Performed by: NURSE PRACTITIONER

## 2018-12-25 PROCEDURE — 83550 IRON BINDING TEST: CPT

## 2018-12-25 PROCEDURE — A9270 NON-COVERED ITEM OR SERVICE: HCPCS | Performed by: SURGERY

## 2018-12-25 PROCEDURE — 770001 HCHG ROOM/CARE - MED/SURG/GYN PRIV*

## 2018-12-25 PROCEDURE — 86901 BLOOD TYPING SEROLOGIC RH(D): CPT

## 2018-12-25 PROCEDURE — 86900 BLOOD TYPING SEROLOGIC ABO: CPT

## 2018-12-25 PROCEDURE — 83540 ASSAY OF IRON: CPT

## 2018-12-25 PROCEDURE — 86850 RBC ANTIBODY SCREEN: CPT

## 2018-12-25 PROCEDURE — 36415 COLL VENOUS BLD VENIPUNCTURE: CPT

## 2018-12-25 RX ORDER — ACETAMINOPHEN 325 MG/1
650 TABLET ORAL ONCE
Status: COMPLETED | OUTPATIENT
Start: 2018-12-25 | End: 2018-12-25

## 2018-12-25 RX ORDER — FOLIC ACID 1 MG/1
1 TABLET ORAL DAILY
Status: DISCONTINUED | OUTPATIENT
Start: 2018-12-26 | End: 2018-12-26 | Stop reason: HOSPADM

## 2018-12-25 RX ORDER — DIPHENHYDRAMINE HYDROCHLORIDE 50 MG/ML
25 INJECTION INTRAMUSCULAR; INTRAVENOUS ONCE
Status: COMPLETED | OUTPATIENT
Start: 2018-12-25 | End: 2018-12-25

## 2018-12-25 RX ORDER — SODIUM CHLORIDE 9 MG/ML
INJECTION, SOLUTION INTRAVENOUS
Status: COMPLETED
Start: 2018-12-25 | End: 2018-12-25

## 2018-12-25 RX ORDER — DIPHENHYDRAMINE HCL 25 MG
25 TABLET ORAL ONCE
Status: COMPLETED | OUTPATIENT
Start: 2018-12-25 | End: 2018-12-25

## 2018-12-25 RX ADMIN — ACETAMINOPHEN 650 MG: 325 TABLET, FILM COATED ORAL at 13:17

## 2018-12-25 RX ADMIN — SPIRONOLACTONE 50 MG: 50 TABLET ORAL at 05:11

## 2018-12-25 RX ADMIN — HYDROXYCHLOROQUINE SULFATE 200 MG: 200 TABLET, FILM COATED ORAL at 05:13

## 2018-12-25 RX ADMIN — NYSTATIN: 100000 POWDER TOPICAL at 18:00

## 2018-12-25 RX ADMIN — OXYCODONE HYDROCHLORIDE 5 MG: 5 TABLET ORAL at 19:49

## 2018-12-25 RX ADMIN — IRON DEXTRAN 1800 MG: 50 INJECTION INTRAMUSCULAR; INTRAVENOUS at 15:19

## 2018-12-25 RX ADMIN — IRON DEXTRAN 25 MG: 50 INJECTION INTRAMUSCULAR; INTRAVENOUS at 13:34

## 2018-12-25 RX ADMIN — LEVOTHYROXINE SODIUM 100 MCG: 0.05 TABLET ORAL at 05:11

## 2018-12-25 RX ADMIN — DIPHENHYDRAMINE HCL 25 MG: 25 TABLET ORAL at 13:17

## 2018-12-25 RX ADMIN — ATENOLOL 50 MG: 50 TABLET ORAL at 17:11

## 2018-12-25 RX ADMIN — ACETAMINOPHEN 500 MG: 500 TABLET ORAL at 11:22

## 2018-12-25 RX ADMIN — DOCUSATE SODIUM 100 MG: 100 CAPSULE, LIQUID FILLED ORAL at 05:11

## 2018-12-25 RX ADMIN — SODIUM CHLORIDE: 9 INJECTION, SOLUTION INTRAVENOUS at 13:15

## 2018-12-25 RX ADMIN — ALLOPURINOL 300 MG: 300 TABLET ORAL at 05:11

## 2018-12-25 RX ADMIN — NYSTATIN: 100000 POWDER TOPICAL at 05:11

## 2018-12-25 RX ADMIN — HYDROXYCHLOROQUINE SULFATE 200 MG: 200 TABLET, FILM COATED ORAL at 17:11

## 2018-12-25 RX ADMIN — ACETAMINOPHEN 1000 MG: 500 TABLET ORAL at 05:11

## 2018-12-25 ASSESSMENT — ENCOUNTER SYMPTOMS
SHORTNESS OF BREATH: 0
FEVER: 0
ABDOMINAL PAIN: 0
MYALGIAS: 1
NAUSEA: 0
DOUBLE VISION: 0
CHILLS: 0
CONSTIPATION: 1
VOMITING: 0

## 2018-12-25 ASSESSMENT — PAIN SCALES - GENERAL
PAINLEVEL_OUTOF10: 7
PAINLEVEL_OUTOF10: 0

## 2018-12-25 NOTE — CARE PLAN
Problem: Safety  Goal: Will remain free from injury  Outcome: PROGRESSING AS EXPECTED  Bed low and locked, call light and belongings in reach, hourly rounding in place, pt calls appropriately for assistance    Problem: Knowledge Deficit  Goal: Knowledge of the prescribed therapeutic regimen will improve  Outcome: PROGRESSING AS EXPECTED  Updated on POC throughout shift

## 2018-12-25 NOTE — PROGRESS NOTES
IV Iron Per Pharmacy Note    Patient Lean Body Weight:  61.6 kg  Reason for Iron Replacement: Acute blood loss      Lab Results   Component Value Date/Time    WBC 5.5 12/25/2018 07:48 AM    RBC 2.16 (L) 12/25/2018 07:48 AM    HEMOGLOBIN 7.2 (L) 12/25/2018 07:48 AM    HEMATOCRIT 22.3 (L) 12/25/2018 07:48 AM    .2 (H) 12/25/2018 07:48 AM    MCH 33.3 (H) 12/25/2018 07:48 AM    MCHC 32.3 (L) 12/25/2018 07:48 AM    MPV 12.6 12/25/2018 07:48 AM       Recent Labs      12/25/18   1153   IRON  37*         Recent Labs      12/25/18   0017   CREATININE  1.19          Assessment/Plan:  1. IV Iron Indicated.   2. Give Iron Dextran 25 mg IV test dose following diphenhydramine/acetaminophen premeds over 30 minutes per protocol.  3. If no reaction (Anaphylaxis, Hypotension/Hypertension, N/V/D, Chest pain/Back Pain, Urticaria/Pruritis) in the next hour, proceed to full dose. Nursing to call the pharmacy IV room at ext. [phone] for full dose.  4. Full dose: Iron Dextran 1800 mg IV over 4 hours. Continue to monitor for delayed ADR including: Arthralgia/myalgia, Headache/backache, chills/dizziness/malaise, moderate to high fever and n/v.      Afua Estrada, PharmD., BCCCP

## 2018-12-25 NOTE — PROGRESS NOTES
Bedside report completed.  A&O x4.  In no acute distress.   VSS.  SpO2 100% on RA.  Ambulating with standby assistance  Tolerating regular diet, denies N/V.  declines pain    Call light and personal belongings within reach. POC discussed and all questions answered. No additional needs at this time.  t

## 2018-12-25 NOTE — PROGRESS NOTES
Received pt to the unit. Assessment complete.  2 RN skin check complete:   left flank ecchymosis  redness/rash under breasts, bilaterally with barrier power in place.  redness/rash underneath panus/abdomen with barrier power and intradry placed.  dry cracking feet bilaterally.

## 2018-12-25 NOTE — PROGRESS NOTES
Trauma / Surgical Daily Progress Note    Date of Service  12/25/2018    Chief Complaint  63 y.o. female admitted 12/21/2018 with Trauma    Interval Events    Transferred to surgical hatch  Abdominal exam benign   Anemia - transfusion not required.  Renal indices trend up.   Constipation   Tertiary exam completed    - Continue Q 12 hr CBC. Trend abdominal exam  - Iron replacement per pharmacy kinetics   - Encourage oral hydration   - Hold outpatient diuretics - Aldactone and Demadex   - Aggressive bowel hygiene reviewed with pt and bedside RN   - Counseled       Review of Systems  Review of Systems   Constitutional: Negative for chills and fever.   HENT: Negative for hearing loss.    Eyes: Negative for double vision.   Respiratory: Negative for shortness of breath.    Cardiovascular: Negative for chest pain.   Gastrointestinal: Positive for constipation (BM prior to arrival ). Negative for abdominal pain, nausea and vomiting.   Genitourinary: Negative for dysuria.   Musculoskeletal: Positive for myalgias.        Vital Signs  Temp:  [36.4 °C (97.6 °F)-37.2 °C (99 °F)] 36.4 °C (97.6 °F)  Pulse:  [64-78] 72  Resp:  [17-30] 17  BP: (106-133)/(49-96) 123/96    Physical Exam  Physical Exam   Constitutional: She is oriented to person, place, and time.   HENT:   Head: Normocephalic.   Eyes: Pupils are equal, round, and reactive to light.   Cardiovascular: Regular rhythm.    Pulmonary/Chest: No respiratory distress.   Abdominal: Soft. She exhibits no distension. There is no tenderness. There is no rebound and no guarding.   Neurological: She is oriented to person, place, and time.   Skin: Skin is warm and dry. She is not diaphoretic.   Psychiatric: She has a normal mood and affect.       Laboratory  Recent Results (from the past 24 hour(s))   CBC WITHOUT DIFFERENTIAL    Collection Time: 12/24/18  1:30 PM   Result Value Ref Range    WBC 6.3 4.8 - 10.8 K/uL    RBC 2.35 (L) 4.20 - 5.40 M/uL    Hemoglobin 7.8 (L) 12.0 - 16.0 g/dL     Hematocrit 24.6 (L) 37.0 - 47.0 %    .7 (H) 81.4 - 97.8 fL    MCH 33.2 (H) 27.0 - 33.0 pg    MCHC 31.7 (L) 33.6 - 35.0 g/dL    RDW 57.2 (H) 35.9 - 50.0 fL    Platelet Count 88 (L) 164 - 446 K/uL    MPV 12.1 9.0 - 12.9 fL   BASIC METABOLIC PANEL    Collection Time: 12/25/18 12:17 AM   Result Value Ref Range    Sodium 139 135 - 145 mmol/L    Potassium 3.9 3.6 - 5.5 mmol/L    Chloride 105 96 - 112 mmol/L    Co2 25 20 - 33 mmol/L    Glucose 103 (H) 65 - 99 mg/dL    Bun 24 (H) 8 - 22 mg/dL    Creatinine 1.19 0.50 - 1.40 mg/dL    Calcium 9.6 8.5 - 10.5 mg/dL    Anion Gap 9.0 0.0 - 11.9   ESTIMATED GFR    Collection Time: 12/25/18 12:17 AM   Result Value Ref Range    GFR If  56 (A) >60 mL/min/1.73 m 2    GFR If Non  46 (A) >60 mL/min/1.73 m 2   CBC WITHOUT DIFFERENTIAL    Collection Time: 12/25/18  5:55 AM   Result Value Ref Range    WBC 5.1 4.8 - 10.8 K/uL    RBC 2.02 (L) 4.20 - 5.40 M/uL    Hemoglobin 6.8 (L) 12.0 - 16.0 g/dL    Hematocrit 21.0 (L) 37.0 - 47.0 %    .0 (H) 81.4 - 97.8 fL    MCH 33.7 (H) 27.0 - 33.0 pg    MCHC 32.4 (L) 33.6 - 35.0 g/dL    RDW 55.8 (H) 35.9 - 50.0 fL    Platelet Count 84 (L) 164 - 446 K/uL    MPV 12.0 9.0 - 12.9 fL   CBC WITH DIFFERENTIAL    Collection Time: 12/25/18  7:48 AM   Result Value Ref Range    WBC 5.5 4.8 - 10.8 K/uL    RBC 2.16 (L) 4.20 - 5.40 M/uL    Hemoglobin 7.2 (L) 12.0 - 16.0 g/dL    Hematocrit 22.3 (L) 37.0 - 47.0 %    .2 (H) 81.4 - 97.8 fL    MCH 33.3 (H) 27.0 - 33.0 pg    MCHC 32.3 (L) 33.6 - 35.0 g/dL    RDW 54.4 (H) 35.9 - 50.0 fL    Platelet Count 93 (L) 164 - 446 K/uL    MPV 12.6 9.0 - 12.9 fL    Neutrophils-Polys 67.70 44.00 - 72.00 %    Lymphocytes 17.90 (L) 22.00 - 41.00 %    Monocytes 10.60 0.00 - 13.40 %    Eosinophils 2.70 0.00 - 6.90 %    Basophils 0.40 0.00 - 1.80 %    Immature Granulocytes 0.70 0.00 - 0.90 %    Nucleated RBC 0.50 /100 WBC    Neutrophils (Absolute) 3.72 2.00 - 7.15 K/uL    Lymphs  (Absolute) 0.98 (L) 1.00 - 4.80 K/uL    Monos (Absolute) 0.58 0.00 - 0.85 K/uL    Eos (Absolute) 0.15 0.00 - 0.51 K/uL    Baso (Absolute) 0.02 0.00 - 0.12 K/uL    Immature Granulocytes (abs) 0.04 0.00 - 0.11 K/uL    NRBC (Absolute) 0.03 K/uL   COD - Adult (Type and Screen)    Collection Time: 12/25/18  8:31 AM   Result Value Ref Range    ABO Grouping Only A     Rh Grouping Only POS     Antibody Screen-Cod NEG        Fluids    Intake/Output Summary (Last 24 hours) at 12/25/18 1016  Last data filed at 12/24/18 1600   Gross per 24 hour   Intake              240 ml   Output              850 ml   Net             -610 ml       Core Measures & Quality Metrics  Labs reviewed, Medications reviewed and Radiology images reviewed  Alcazar catheter: No Alcazar      DVT Prophylaxis: Contraindicated - High bleeding risk  DVT prophylaxis - mechanical: SCDs  Ulcer prophylaxis: Not indicated    Assessed for rehab: Patient returned to prior level of function, rehabilitation not indicated at this time    Total Score: 7    ETOH Screening  CAGE Score: 0  Intervention complete date: 12/25/2018  Patient response to intervention: Denies substance abuse. Intervention not indicated .         Assessment/Plan  Spleen injury- (present on admission)   Assessment & Plan    No contrast given. Large amount of left upper quadrant perisplenic acute hemorrhage.   Trend abdominal exam and laboratory studies      Acute blood loss anemia- (present on admission)   Assessment & Plan    12/23 Transfused 1 unit of PRBC's  12/25 Transfusion not required. Iron replacement per pharmacy kinetics if clinically indicated  Trend abdominal exam and laboratory studies     Thrombocytopenia (HCC)- (present on admission)   Assessment & Plan    12.21 111 on admission   12/24 Trend up    Trend laboratory studies     Dehydration, moderate- (present on admission)   Assessment & Plan    Dry mucus membranes  Renal indices improved with hydration  Hold Demadex and Aldactone      Anticoagulated on Coumadin- (present on admission)   Assessment & Plan    Vitamin K prior to arrival  INR 1.67  KCentra in ER ~ 25 units /kg  12/21 TEG normalized       Closed fracture of one rib of left side with routine healing- (present on admission)   Assessment & Plan    Single left rib fracture   Aggressive multimodal pain management and pulmonary hygiene.   Trend chest radiographs.       Contraindication to deep vein thrombosis (DVT) prophylaxis   Assessment & Plan    Pharmacological DVT prophylaxis contraindicated due to splenic injury  RAP score  12/22 Lower extremity sonogram negative for DVT   Weekly lower extremity trauma sonogram     Obesity (BMI 30-39.9)- (present on admission)   Assessment & Plan    BMI 39.2      Essential hypertension- (present on admission)   Assessment & Plan    Chronic condition treated with Atenolol, Demadex, and Aldactone.  12/24 Resume maintenance medication Atenolol   Hold Demadex and Aldactone. (trend renal indices)     Other forms of systemic lupus erythematosus (HCC)- (present on admission)   Assessment & Plan    Chronic condition treated with Plaquenil.  12/24Resumed maintenance medication.  No steroids > 10 yrs  Chronic kidney disease , creatinine generally in the 1.4-1.5 range       Other specified hypothyroidism- (present on admission)   Assessment & Plan    Chronic condition treated with levothyroxine   12/24 Resume maintenance medication      Stroke (HCC)- (present on admission)   Assessment & Plan    Chronic condition treated with Coumadin.  Hold maintenance medication.      Trauma- (present on admission)   Assessment & Plan    Tripped over cat 4 days PTA - struck left side on couch   Seen at [place] on 12/21  Trauma Yellow Transfer Activation.  Shawn Garcia MD. Trauma Surgery.        Discussed patient condition with Patient and trauma surgery, Dr. Shawn Garcia.    Patient seen, data reviewed and discussed.  Agree with assessment and plan.  ASMITA

## 2018-12-25 NOTE — CARE PLAN
Problem: Safety  Goal: Will remain free from falls  Patient is a low fall risk. Patient educated on level of risk. Educated to call for assistance. Call light left within reach of patient.     Problem: Infection  Goal: Will remain free from infection    Intervention: Implement standard precautions and perform hand washing before and after patient contact  Hand hygiene performed prior to and after entering pt room. Gloves worn during patient interactions.

## 2018-12-26 ENCOUNTER — APPOINTMENT (OUTPATIENT)
Dept: RADIOLOGY | Facility: MEDICAL CENTER | Age: 63
DRG: 814 | End: 2018-12-26
Attending: SURGERY
Payer: COMMERCIAL

## 2018-12-26 ENCOUNTER — APPOINTMENT (OUTPATIENT)
Dept: CARDIOLOGY | Facility: MEDICAL CENTER | Age: 63
DRG: 814 | End: 2018-12-26
Attending: SURGERY
Payer: COMMERCIAL

## 2018-12-26 VITALS
SYSTOLIC BLOOD PRESSURE: 120 MMHG | HEART RATE: 66 BPM | TEMPERATURE: 98.4 F | WEIGHT: 252.43 LBS | BODY MASS INDEX: 39.62 KG/M2 | RESPIRATION RATE: 16 BRPM | OXYGEN SATURATION: 95 % | HEIGHT: 67 IN | DIASTOLIC BLOOD PRESSURE: 50 MMHG

## 2018-12-26 LAB
ANION GAP SERPL CALC-SCNC: 6 MMOL/L (ref 0–11.9)
BASOPHILS # BLD AUTO: 0.1 % (ref 0–1.8)
BASOPHILS # BLD: 0.01 K/UL (ref 0–0.12)
BNP SERPL-MCNC: 288 PG/ML (ref 0–100)
BUN SERPL-MCNC: 20 MG/DL (ref 8–22)
CALCIUM SERPL-MCNC: 9.8 MG/DL (ref 8.5–10.5)
CHLORIDE SERPL-SCNC: 107 MMOL/L (ref 96–112)
CO2 SERPL-SCNC: 24 MMOL/L (ref 20–33)
CREAT SERPL-MCNC: 1.06 MG/DL (ref 0.5–1.4)
EOSINOPHIL # BLD AUTO: 0.2 K/UL (ref 0–0.51)
EOSINOPHIL NFR BLD: 2.9 % (ref 0–6.9)
ERYTHROCYTE [DISTWIDTH] IN BLOOD BY AUTOMATED COUNT: 54.4 FL (ref 35.9–50)
FOLATE SERPL-MCNC: 12.6 NG/ML
GLUCOSE SERPL-MCNC: 105 MG/DL (ref 65–99)
HCT VFR BLD AUTO: 22.5 % (ref 37–47)
HGB BLD-MCNC: 7.3 G/DL (ref 12–16)
IMM GRANULOCYTES # BLD AUTO: 0.05 K/UL (ref 0–0.11)
IMM GRANULOCYTES NFR BLD AUTO: 0.7 % (ref 0–0.9)
LV EJECT FRACT  99904: 65
LV EJECT FRACT MOD 2C 99903: 68.36
LV EJECT FRACT MOD 4C 99902: 68.59
LV EJECT FRACT MOD BP 99901: 69.13
LYMPHOCYTES # BLD AUTO: 0.9 K/UL (ref 1–4.8)
LYMPHOCYTES NFR BLD: 12.9 % (ref 22–41)
MCH RBC QN AUTO: 33.5 PG (ref 27–33)
MCHC RBC AUTO-ENTMCNC: 32.4 G/DL (ref 33.6–35)
MCV RBC AUTO: 103.2 FL (ref 81.4–97.8)
MONOCYTES # BLD AUTO: 0.78 K/UL (ref 0–0.85)
MONOCYTES NFR BLD AUTO: 11.2 % (ref 0–13.4)
NEUTROPHILS # BLD AUTO: 5.01 K/UL (ref 2–7.15)
NEUTROPHILS NFR BLD: 72.2 % (ref 44–72)
NRBC # BLD AUTO: 0.05 K/UL
NRBC BLD-RTO: 0.7 /100 WBC
PHOSPHATE SERPL-MCNC: 2.7 MG/DL (ref 2.5–4.5)
PLATELET # BLD AUTO: 112 K/UL (ref 164–446)
PMV BLD AUTO: 12.2 FL (ref 9–12.9)
POTASSIUM SERPL-SCNC: 4.1 MMOL/L (ref 3.6–5.5)
RBC # BLD AUTO: 2.18 M/UL (ref 4.2–5.4)
SODIUM SERPL-SCNC: 137 MMOL/L (ref 135–145)
VIT B12 SERPL-MCNC: 203 PG/ML (ref 211–911)
WBC # BLD AUTO: 7 K/UL (ref 4.8–10.8)

## 2018-12-26 PROCEDURE — 84100 ASSAY OF PHOSPHORUS: CPT

## 2018-12-26 PROCEDURE — 700102 HCHG RX REV CODE 250 W/ 637 OVERRIDE(OP): Performed by: SURGERY

## 2018-12-26 PROCEDURE — G8988 SELF CARE GOAL STATUS: HCPCS | Mod: CI

## 2018-12-26 PROCEDURE — 93306 TTE W/DOPPLER COMPLETE: CPT

## 2018-12-26 PROCEDURE — 97162 PT EVAL MOD COMPLEX 30 MIN: CPT

## 2018-12-26 PROCEDURE — 80048 BASIC METABOLIC PNL TOTAL CA: CPT

## 2018-12-26 PROCEDURE — 82746 ASSAY OF FOLIC ACID SERUM: CPT

## 2018-12-26 PROCEDURE — G8978 MOBILITY CURRENT STATUS: HCPCS | Mod: CI

## 2018-12-26 PROCEDURE — G8987 SELF CARE CURRENT STATUS: HCPCS | Mod: CI

## 2018-12-26 PROCEDURE — 82607 VITAMIN B-12: CPT

## 2018-12-26 PROCEDURE — 83880 ASSAY OF NATRIURETIC PEPTIDE: CPT

## 2018-12-26 PROCEDURE — A9270 NON-COVERED ITEM OR SERVICE: HCPCS | Performed by: SURGERY

## 2018-12-26 PROCEDURE — G8979 MOBILITY GOAL STATUS: HCPCS | Mod: CI

## 2018-12-26 PROCEDURE — 97535 SELF CARE MNGMENT TRAINING: CPT

## 2018-12-26 PROCEDURE — 93306 TTE W/DOPPLER COMPLETE: CPT | Mod: 26 | Performed by: INTERNAL MEDICINE

## 2018-12-26 PROCEDURE — 700112 HCHG RX REV CODE 229: Performed by: SURGERY

## 2018-12-26 PROCEDURE — 97165 OT EVAL LOW COMPLEX 30 MIN: CPT

## 2018-12-26 PROCEDURE — 700111 HCHG RX REV CODE 636 W/ 250 OVERRIDE (IP): Performed by: SURGERY

## 2018-12-26 PROCEDURE — 71045 X-RAY EXAM CHEST 1 VIEW: CPT

## 2018-12-26 PROCEDURE — G8989 SELF CARE D/C STATUS: HCPCS | Mod: CI

## 2018-12-26 PROCEDURE — 85025 COMPLETE CBC W/AUTO DIFF WBC: CPT

## 2018-12-26 RX ORDER — WARFARIN SODIUM 5 MG/1
5 TABLET ORAL DAILY
Qty: 30 TAB | Refills: 0
Start: 2018-12-28

## 2018-12-26 RX ORDER — ACETAMINOPHEN 325 MG/1
650 TABLET ORAL EVERY 6 HOURS PRN
Qty: 30 TAB | Refills: 0 | COMMUNITY
Start: 2018-12-26 | End: 2020-01-27

## 2018-12-26 RX ORDER — ACETAMINOPHEN 325 MG/1
650 TABLET ORAL EVERY 6 HOURS PRN
Status: DISCONTINUED | OUTPATIENT
Start: 2018-12-26 | End: 2018-12-26 | Stop reason: HOSPADM

## 2018-12-26 RX ORDER — CYANOCOBALAMIN 1000 UG/ML
1000 INJECTION, SOLUTION INTRAMUSCULAR; SUBCUTANEOUS
Status: DISCONTINUED | OUTPATIENT
Start: 2018-12-26 | End: 2018-12-26 | Stop reason: HOSPADM

## 2018-12-26 RX ORDER — OXYCODONE HYDROCHLORIDE 5 MG/1
2.5-5 TABLET ORAL
Status: DISCONTINUED | OUTPATIENT
Start: 2018-12-26 | End: 2018-12-26 | Stop reason: HOSPADM

## 2018-12-26 RX ADMIN — ALLOPURINOL 300 MG: 300 TABLET ORAL at 04:34

## 2018-12-26 RX ADMIN — NYSTATIN: 100000 POWDER TOPICAL at 04:34

## 2018-12-26 RX ADMIN — FOLIC ACID 1 MG: 1 TABLET ORAL at 04:34

## 2018-12-26 RX ADMIN — ACETAMINOPHEN 1000 MG: 500 TABLET ORAL at 10:34

## 2018-12-26 RX ADMIN — ACETAMINOPHEN 1000 MG: 500 TABLET ORAL at 04:34

## 2018-12-26 RX ADMIN — HYDROXYCHLOROQUINE SULFATE 200 MG: 200 TABLET, FILM COATED ORAL at 04:34

## 2018-12-26 RX ADMIN — CYANOCOBALAMIN 1000 MCG: 1000 INJECTION, SOLUTION INTRAMUSCULAR; SUBCUTANEOUS at 10:34

## 2018-12-26 RX ADMIN — ACETAMINOPHEN 1000 MG: 500 TABLET ORAL at 00:15

## 2018-12-26 RX ADMIN — LEVOTHYROXINE SODIUM 100 MCG: 0.05 TABLET ORAL at 04:34

## 2018-12-26 ASSESSMENT — ENCOUNTER SYMPTOMS
DOUBLE VISION: 0
NAUSEA: 0
CHILLS: 0
FEVER: 0
ABDOMINAL PAIN: 0
CONSTIPATION: 0
SHORTNESS OF BREATH: 0
ROS GI COMMENTS: BM 12/25
MYALGIAS: 1
VOMITING: 0

## 2018-12-26 ASSESSMENT — ACTIVITIES OF DAILY LIVING (ADL): TOILETING: INDEPENDENT

## 2018-12-26 ASSESSMENT — GAIT ASSESSMENTS
DISTANCE (FEET): 200
DEVIATION: BRADYKINETIC
GAIT LEVEL OF ASSIST: SUPERVISED

## 2018-12-26 ASSESSMENT — COGNITIVE AND FUNCTIONAL STATUS - GENERAL
MOBILITY SCORE: 23
SUGGESTED CMS G CODE MODIFIER MOBILITY: CI
HELP NEEDED FOR BATHING: A LITTLE
DAILY ACTIVITIY SCORE: 22
TOILETING: A LITTLE
SUGGESTED CMS G CODE MODIFIER DAILY ACTIVITY: CJ
CLIMB 3 TO 5 STEPS WITH RAILING: A LITTLE

## 2018-12-26 ASSESSMENT — PAIN SCALES - GENERAL: PAINLEVEL_OUTOF10: 2

## 2018-12-26 NOTE — PROGRESS NOTES
Trauma / Surgical Daily Progress Note    Date of Service  12/26/2018    Chief Complaint  63 y.o. female admitted 12/21/2018 with Trauma    Interval Events  Hb and CXR stable, renal indices improved  Minimal complaints of pain  Mobilizing better  OT recommendations reviewed, PT eval pending  Tertiary survey complete - no further findings    - Plan for discharge this afternoon  - Coumadin to be resumed in 2 days    Review of Systems  Review of Systems   Constitutional: Negative for chills and fever.   HENT: Negative for hearing loss.    Eyes: Negative for double vision.   Respiratory: Negative for shortness of breath.    Cardiovascular: Negative for chest pain.   Gastrointestinal: Negative for abdominal pain, constipation, nausea and vomiting.        BM 12/25   Genitourinary: Negative for dysuria.        Voiding   Musculoskeletal: Positive for myalgias.        Vital Signs  Temp:  [36.7 °C (98 °F)-37.1 °C (98.7 °F)] 36.9 °C (98.4 °F)  Pulse:  [66-76] 66  Resp:  [16-22] 16  BP: (119-160)/(50-89) 120/50    Physical Exam  Physical Exam   Constitutional: She is oriented to person, place, and time. She appears well-developed. No distress.   HENT:   Head: Normocephalic.   Eyes: Conjunctivae are normal.   Neck: Neck supple.   Cardiovascular: Normal rate.    Pulmonary/Chest: Effort normal. No respiratory distress.   Room air   Abdominal: Soft. Bowel sounds are normal. She exhibits no distension. There is no tenderness. There is no rebound and no guarding.   Musculoskeletal: Normal range of motion.   Neurological: She is alert and oriented to person, place, and time.   Skin: Skin is warm and dry.   Psychiatric: She has a normal mood and affect. Her behavior is normal.   Nursing note and vitals reviewed.      Laboratory  Recent Results (from the past 24 hour(s))   BASIC METABOLIC PANEL    Collection Time: 12/26/18  4:14 AM   Result Value Ref Range    Sodium 137 135 - 145 mmol/L    Potassium 4.1 3.6 - 5.5 mmol/L    Chloride 107  96 - 112 mmol/L    Co2 24 20 - 33 mmol/L    Glucose 105 (H) 65 - 99 mg/dL    Bun 20 8 - 22 mg/dL    Creatinine 1.06 0.50 - 1.40 mg/dL    Calcium 9.8 8.5 - 10.5 mg/dL    Anion Gap 6.0 0.0 - 11.9   BTYPE NATRIURETIC PEPTIDE    Collection Time: 12/26/18  4:14 AM   Result Value Ref Range    B Natriuretic Peptide 288 (H) 0 - 100 pg/mL   VITAMIN B12    Collection Time: 12/26/18  4:14 AM   Result Value Ref Range    Vitamin B12 -True Cobalamin 203 (L) 211 - 911 pg/mL   FOLATE    Collection Time: 12/26/18  4:14 AM   Result Value Ref Range    Folate -Folic Acid 12.6 >4.0 ng/mL   PHOSPHORUS    Collection Time: 12/26/18  4:14 AM   Result Value Ref Range    Phosphorus 2.7 2.5 - 4.5 mg/dL   CBC WITH DIFFERENTIAL    Collection Time: 12/26/18  4:14 AM   Result Value Ref Range    WBC 7.0 4.8 - 10.8 K/uL    RBC 2.18 (L) 4.20 - 5.40 M/uL    Hemoglobin 7.3 (L) 12.0 - 16.0 g/dL    Hematocrit 22.5 (L) 37.0 - 47.0 %    .2 (H) 81.4 - 97.8 fL    MCH 33.5 (H) 27.0 - 33.0 pg    MCHC 32.4 (L) 33.6 - 35.0 g/dL    RDW 54.4 (H) 35.9 - 50.0 fL    Platelet Count 112 (L) 164 - 446 K/uL    MPV 12.2 9.0 - 12.9 fL    Neutrophils-Polys 72.20 (H) 44.00 - 72.00 %    Lymphocytes 12.90 (L) 22.00 - 41.00 %    Monocytes 11.20 0.00 - 13.40 %    Eosinophils 2.90 0.00 - 6.90 %    Basophils 0.10 0.00 - 1.80 %    Immature Granulocytes 0.70 0.00 - 0.90 %    Nucleated RBC 0.70 /100 WBC    Neutrophils (Absolute) 5.01 2.00 - 7.15 K/uL    Lymphs (Absolute) 0.90 (L) 1.00 - 4.80 K/uL    Monos (Absolute) 0.78 0.00 - 0.85 K/uL    Eos (Absolute) 0.20 0.00 - 0.51 K/uL    Baso (Absolute) 0.01 0.00 - 0.12 K/uL    Immature Granulocytes (abs) 0.05 0.00 - 0.11 K/uL    NRBC (Absolute) 0.05 K/uL   ESTIMATED GFR    Collection Time: 12/26/18  4:14 AM   Result Value Ref Range    GFR If African American >60 >60 mL/min/1.73 m 2    GFR If Non African American 52 (A) >60 mL/min/1.73 m 2   EC-ECHOCARDIOGRAM COMPLETE W/O CONT    Collection Time: 12/26/18  9:28 AM   Result Value Ref  Range    Eject.Frac. MOD BP 69.13     Eject.Frac. MOD 4C 68.59     Eject.Frac. MOD 2C 68.36     Left Ventrical Ejection Fraction 65        Fluids    Intake/Output Summary (Last 24 hours) at 12/26/18 1249  Last data filed at 12/26/18 0800   Gross per 24 hour   Intake           872.71 ml   Output                0 ml   Net           872.71 ml       Core Measures & Quality Metrics  Medications reviewed, Labs reviewed and Radiology images reviewed  Alcazar catheter: No Alcazar      DVT Prophylaxis: Contraindicated - High bleeding risk  DVT prophylaxis - mechanical: SCDs  Ulcer prophylaxis: Not indicated    Assessed for rehab: Patient returned to prior level of function, rehabilitation not indicated at this time    Total Score: 7    ETOH Screening  CAGE Score: 0  Intervention complete date: 12/25/2018  Patient response to intervention: Denies substance abuse. Intervention not indicated .         Assessment/Plan  Spleen injury- (present on admission)   Assessment & Plan    No contrast given. Large amount of left upper quadrant perisplenic acute hemorrhage.   Trend abdominal exam and laboratory studies       Acute blood loss anemia- (present on admission)   Assessment & Plan    12/23 Transfused 1 unit of PRBC's  12/25 Transfusion not required.   Iron replacement per pharmacy protocol      Thrombocytopenia (HCC)- (present on admission)   Assessment & Plan    12.21 111 on admission   12/24 Trend up    Trend laboratory studies      Dehydration, moderate- (present on admission)   Assessment & Plan    Dry mucus membranes  Renal indices improved with hydration  Hold Demadex and Aldactone      Anticoagulated on Coumadin- (present on admission)   Assessment & Plan    Vitamin K prior to arrival  INR 1.67  KCentra in ER ~ 25 units /kg  12/21 TEG normalized        Closed fracture of one rib of left side with routine healing- (present on admission)   Assessment & Plan    Single left rib fracture   Aggressive multimodal pain management and  pulmonary hygiene.   Trend chest radiographs.        Contraindication to deep vein thrombosis (DVT) prophylaxis- (present on admission)   Assessment & Plan    Pharmacological DVT prophylaxis contraindicated due to splenic injury  RAP score  12/22 Lower extremity sonogram negative for DVT   Weekly lower extremity trauma sonogram      Obesity (BMI 30-39.9)- (present on admission)   Assessment & Plan    BMI 39.2      Essential hypertension- (present on admission)   Assessment & Plan    Chronic condition treated with Atenolol, Demadex, and Aldactone.  12/24 Resume maintenance medication Atenolol   Hold Demadex and Aldactone. (trend renal indices)     Other forms of systemic lupus erythematosus (HCC)- (present on admission)   Assessment & Plan    Chronic condition treated with Plaquenil.  12/24Resumed maintenance medication.  No steroids > 10 yrs  Chronic kidney disease , creatinine generally in the 1.4-1.5 range        Other specified hypothyroidism- (present on admission)   Assessment & Plan    Chronic condition treated with levothyroxine   12/24 Resume maintenance medication       Stroke (HCC)- (present on admission)   Assessment & Plan    Chronic condition treated with Coumadin.  Hold maintenance medication.       Trauma- (present on admission)   Assessment & Plan    Tripped over cat 4 days PTA - struck left side on couch   Seen at [facility] on 12/21  Trauma Yellow Transfer Activation.  Shawn Garcia MD. Trauma Surgery.         Discussed patient condition with RN, Patient and trauma surgery. Dr. Garcia    Patient seen, data reviewed and discussed.  Agree with assessment and plan.  ASMITA

## 2018-12-26 NOTE — CARE PLAN
Problem: Safety  Goal: Will remain free from falls  Patient is a low fall risk. Patient able to ambulate in room without assistance. Educated on level of risk. Educated to call for assistance when necessary. Call light left within reach of patient.     Problem: Infection  Goal: Will remain free from infection    Intervention: Implement standard precautions and perform hand washing before and after patient contact  Hand hygiene performed prior to and after entering pt room. Gloves worn during patient interactions.

## 2018-12-26 NOTE — DISCHARGE INSTRUCTIONS
Discharge Instructions    Discharged to home by car withfamily. Discharged via wheelchair, hospital escort: yes.    Special equipment needed: none    Be sure to schedule a follow-up appointment with your primary care doctor or any specialists as instructed.     Discharge Plan:   Diet Plan: Discussed  Activity Level: Discussed  Confirmed Follow up Appointment: Patient to Call and Schedule Appointment  Confirmed Symptoms Management: Discussed  Medication Reconciliation Updated: Yes  Influenza Vaccine Indication: Not indicated: Previously immunized this influenza season and > 8 years of age    I understand that a diet low in cholesterol, fat, and sodium is recommended for good health. Unless I have been given specific instructions below for another diet, I accept this instruction as my diet prescription.   Other diet: Regular    Special Instructions:     - Call or seek medical attention for questions or concerns   - Follow up with Dr. Garcia in 1 weeks time   - Avoid all blood thinners including aspirin or NSAIDs (ibuprophen, Advil, Aleve, Motrin) for at least two weeks   - Follow up with primary care provider within one weeks time   - Resume regular diet   - May take over the counter acetaminophen as needed for pain   - Continue daily over the counter stool softener while on narcotics   - No operation of machinery or motorized vehicles while under the influence of narcotics   - No alcohol, marijuana or illicit drug use while under the influence of narcotics   - No swimming, hot tubs, baths or wound submersion until cleared by outpatient provider. May shower   - No contact sports, strenuous activities, or heavy lifting until cleared by outpatient provider   - If respiratory decompensation, uncontrolled pain, or signs or symptoms of infection occur seek medical attention    · Is patient discharged on Warfarin / Coumadin?   Resume taking in 2 days    Depression / Suicide Risk    As you are discharged from this [facility] facility, it is important to learn how to keep safe from harming yourself.    Recognize the warning signs:  · Abrupt changes in personality, positive or negative- including increase in energy   · Giving away possessions  · Change in eating patterns- significant weight changes-  positive or negative  · Change in sleeping patterns- unable to sleep or sleeping all the time   · Unwillingness or inability to communicate  · Depression  · Unusual sadness, discouragement and loneliness  · Talk of wanting to die  · Neglect of personal appearance   · Rebelliousness- reckless behavior  · Withdrawal from people/activities they love  · Confusion- inability to concentrate     If you or a loved one observes any of these behaviors or has concerns about self-harm, here's what you can do:  · Talk about it- your feelings and reasons for harming yourself  · Remove any means that you might use to hurt yourself (examples: pills, rope, extension cords, firearm)  · Get professional help from the community (Mental Health, Substance Abuse, psychological counseling)  · Do not be alone:Call your Safe Contact- someone whom you trust who will be there for you.  · Call your local CRISIS HOTLINE 038-2920 or 243-693-4364  · Call your local Children's Mobile Crisis Response Team [place] (223) 841-3747 or www.[employer]Blaze  · Call the toll free National Suicide Prevention Hotlines   · National Suicide Prevention Lifeline 347-363-STUX (1915)  · National Hope Line Network 800-SUICIDE (270-3980)

## 2018-12-26 NOTE — PROGRESS NOTES
Bedside report completed.  A&O x4.  In no acute distress.   VSS. on RA.   Ambulating with standby assistance  Tolerating regular diet, denies N/V.  declines pain    Call light and personal belongings within reach. POC discussed and all questions answered. No additional needs at this time.

## 2018-12-26 NOTE — CARE PLAN
Problem: Safety  Goal: Will remain free from injury  Outcome: PROGRESSING AS EXPECTED  Bed low and locked, call light and belongings in reach, hourly rounding in place, pt calls appropriately for assistance    Problem: Respiratory:  Goal: Respiratory status will improve  Outcome: PROGRESSING AS EXPECTED  No issues detected

## 2018-12-27 NOTE — PROGRESS NOTES
Discharge teaching done.  PIV removed    Discharging Patient home per physician order.      Discharged with friend.      Demonstrated understanding of discharge instructions, follow up appointments, home medications, prescriptions.    Ambulating without assistance, voiding without difficulty, pain well controlled, tolerating oral medications, oxygen saturation greater than 90% , tolerating diet.      Educational handouts given and discussed.      Verbalized understanding of discharge instructions and educational handouts.     All questions answered.      Belongings with patient at time of discharge.

## 2018-12-27 NOTE — DISCHARGE SUMMARY
Trauma Discharge Summary    DATE OF ADMISSION: 12/21/2018    DATE OF DISCHARGE: 12/27/2018    LENGTH OF STAY: 5 days    ATTENDING PHYSICIAN: Shawn Garcia MD, trauma surgery    CONSULTING PHYSICIAN:   None    DISCHARGE DIAGNOSIS:  1.  Spleen injury  2.  Closed fracture of one rib of left side with routine healing  3.  Anticoagulated on Coumadin  4.  Dehydration, moderate  5.  Thrombocytopenia  6.  Acute blood loss anemia  7.  Trauma  8.  Stroke, history of  9.  Other specified hypothyroidism  10.  Other forms of systemic lupus erythematosus  11.  Essential hypertension  12.  Obesity (BMI 30-39.9)  13.  Contraindication to deep vein thrombosis prophylaxis    PROCEDURES:  None    HISTORY OF PRESENT ILLNESS: The patient is a 63 y.o. female who reportedly tripped over her cat 4 days prior to arrival, striking her left side on a couch.  She was initially evaluated at [place] on 12/21/2018.  She was subsequently transferred to [facility] for definite trauma care.  She was triaged as a Trauma yellow transfer in accordance with established pre-hospital protocols.    HOSPITAL COURSE: On arrival, she underwent extensive radiographic and laboratory studies and was admitted to the critical care team under the direction and supervision of Dr. Garcia.  She sustained the listed injuries and incurred the listed diagnosis during her stay.    She was transferred from the emergency department to the trauma intensive care unit.     Initial imaging was performed with no contrast however large amount of left upper quadrant perisplenic acute hemorrhage was noted.  The patient was monitored closely with serial abdominal exams and laboratory studies which remained stable during her hospitalization.  She required no further intervention.    She was noted to have sustained a single left rib fracture.  She was placed on aggressive multimodal pain management and pulmonary hygiene and her chest radiographs were trended  closely and remained stable during her hospital stay.    The patient reported being anticoagulated on Coumadin.  She received vitamin K prior to arrival with an INR of 1.67.  She received K Centra 25 units/kg in the emergency department.  A TEG with platelet mapping was performed which was performed 12/21/2018 as well with no further intervention required.    She was noted to have some moderate dehydration, with dry mucous membranes and abnormal renal indices upon arrival.  She was placed on maintenance IV fluid and her Demadex and Aldactone was held during her hospital stay.  Her renal indices did not improve with gentle hydration.    Laboratory studies did reveal a thrombocytopenia which improved during her hospital stay as her laboratory studies were trended.  She was also noted to have an acute blood loss anemia requiring transfusion of 1 unit of packed red blood cells on 12/23/2018.  She did receive iron replacement per pharmacy protocol and no further transfusions were required during her stay.    The patient was noted to have a significant history of stroke which she was treated for with Coumadin.  This medication was held during her hospital stay and is to be resumed 2 days after discharge from the hospital.  She was also noted to have hypothyroidism, chronically treated with levothyroxine, which was resumed on 12/24/2018.  She reported having a systemic lupus erythematosus, treated with Plaquenil which was resumed on 12/24/2018.  She also reported chronic kidney disease with a creatinine generally ranging from 1.4-1.5.  In addition she reported a history of essential hypertension, chronically treated with atenolol, Demadex and Aldactone.  Her Aldactone was resumed during her hospital stay however her other medications were held due to her dehydration and are to be resumed upon discharge home.    She was medically stable for transfer to the general surgical hatch on 12/25/2018 where a tertiary exam was  performed.    The patient was evaluated by physical therapy and occupational therapy prior to discharge home.  There was no equipment recommended per therapy however physical therapy did recommend home health for physical therapy to ensure home safety and accuracy of self-reported environment however the patient declined such services and will follow up with her primary care provider should she decide that she needs the services at any given time.  She was provided a prescription for outpatient occupational therapy.    On the day of discharge the patient is up ambulating independently.  She is reporting adequate pain control.  Her respiratory status is stable on room air with oxygen saturations greater than 92%.  She is tolerating regular diet, voiding, and having bowel movements as expected.  Her laboratory studies remained stable with a hemoglobin of 7.2 on day of discharge.  She is eager and feeling well enough for discharge home and states that she has abundant amount of help from friends and a daughter that lives with her full-time.    DISCHARGE PHYSICAL EXAM: See [facility] physical exam dated 12/27/2018    DISCHARGE MEDICATIONS:  I reviewed the patients controlled substance history and obtained a controlled substance use informed consent (if applicable) provided by [facility] and the patient has been prescribed.       Medication List      START taking these medications      Instructions   acetaminophen 325 MG Tabs  Commonly known as:  TYLENOL   Take 2 Tabs by mouth every 6 hours as needed.  Dose:  650 mg        CONTINUE taking these medications      Instructions   allopurinol 300 MG Tabs  Commonly known as:  ZYLOPRIM   Take 300 mg by mouth every day.  Dose:  300 mg     atenolol 50 MG Tabs  Commonly known as:  TENORMIN   Take 50 mg by mouth every day.  Dose:  50 mg     hydroxychloroquine 200 MG Tabs  Commonly known as:  PLAQUENIL   Take 200 mg by mouth 2 Times a Day.  Dose:  200 mg      levothyroxine 100 MCG Tabs  Commonly known as:  SYNTHROID   Take 100 mcg by mouth every day.  Dose:  100 mcg     spironolactone 50 MG Tabs  Commonly known as:  ALDACTONE   Take 50 mg by mouth every day.  Dose:  50 mg     torsemide 20 MG Tabs  Commonly known as:  DEMADEX   Take 40 mg by mouth every day.  Dose:  40 mg     warfarin 5 MG Tabs  Start taking on:  12/28/2018  Commonly known as:  COUMADIN   Take 1 Tab by mouth every day. Follow up with [facility]  Dose:  5 mg        STOP taking these medications    aspirin 81 MG tablet            DISPOSITION: The patient will be discharged home in stable condition on 12/26/2018.  She will follow up with Dr. Garcia in 1 week's time.    The patient has been extensively counseled and all questions have been answered. Special attention was paid to respiratory decompensation, uncontrolled pain, and signs and symptoms of infection and to seek immediate medical attention if these develop. The patient and family demonstrate understanding and give verbal compliance with discharge instructions.    TIME SPENT ON DISCHARGE: 45 minutes      ____________________________________________  MAGDA Balnd    DD: 12/27/2018 7:23 AM

## 2018-12-27 NOTE — THERAPY
"Physical Therapy Evaluation completed.   Bed Mobility:  Supine to Sit:  (NT, in recliner chair pre/post; sleeps in one at home)  Transfers: Sit to Stand: Supervised  Gait: Level Of Assist: Stand by assist with No Equipment Needed       Plan of Care: Will benefit from Physical Therapy 2 times per week  Discharge Recommendations: Equipment: No Equipment Needed unless ADL/bathroom equipment identified by OT      Pt presents with impaired activity tolerance, dynamic balance and vision s/p GLF. Pt is a fall risk in setting of positive hx and peripheral visual field deficits from prior CVA (~ 10 years ago from pt report, appears to be a perception issue as during function no obstacle negotiation issues noted, however during testing cannot see more than 1-2 inches left or right of midline); she feels she is at baseline and wishes to return home ASAP; this is reasonable, would recommend home health PT to ensure home safety/accuracy of self reported environment; will follow.     See \"Rehab Therapy-Acute\" Patient Summary Report for complete documentation.     "

## 2019-07-09 ENCOUNTER — HOSPITAL ENCOUNTER (OUTPATIENT)
Dept: LAB | Facility: MEDICAL CENTER | Age: 64
End: 2019-07-09
Attending: PSYCHIATRY & NEUROLOGY
Payer: COMMERCIAL

## 2019-07-09 LAB
ALBUMIN SERPL BCP-MCNC: 5.1 G/DL (ref 3.2–4.9)
ALBUMIN/GLOB SERPL: 1.6 G/DL
ALP SERPL-CCNC: 73 U/L (ref 30–99)
ALT SERPL-CCNC: 17 U/L (ref 2–50)
ANION GAP SERPL CALC-SCNC: 11 MMOL/L (ref 0–11.9)
AST SERPL-CCNC: 28 U/L (ref 12–45)
BASOPHILS # BLD AUTO: 0.8 % (ref 0–1.8)
BASOPHILS # BLD: 0.03 K/UL (ref 0–0.12)
BILIRUB SERPL-MCNC: 1.7 MG/DL (ref 0.1–1.5)
BUN SERPL-MCNC: 34 MG/DL (ref 8–22)
CALCIUM SERPL-MCNC: 11.6 MG/DL (ref 8.5–10.5)
CHLORIDE SERPL-SCNC: 104 MMOL/L (ref 96–112)
CO2 SERPL-SCNC: 25 MMOL/L (ref 20–33)
CREAT SERPL-MCNC: 1.41 MG/DL (ref 0.5–1.4)
CRP SERPL HS-MCNC: 0.21 MG/DL (ref 0–0.75)
EOSINOPHIL # BLD AUTO: 0.05 K/UL (ref 0–0.51)
EOSINOPHIL NFR BLD: 1.4 % (ref 0–6.9)
ERYTHROCYTE [DISTWIDTH] IN BLOOD BY AUTOMATED COUNT: 47 FL (ref 35.9–50)
ERYTHROCYTE [SEDIMENTATION RATE] IN BLOOD BY WESTERGREN METHOD: 15 MM/HOUR (ref 0–30)
FOLATE SERPL-MCNC: 11.3 NG/ML
GLOBULIN SER CALC-MCNC: 3.2 G/DL (ref 1.9–3.5)
GLUCOSE SERPL-MCNC: 101 MG/DL (ref 65–99)
HCT VFR BLD AUTO: 48.8 % (ref 37–47)
HGB BLD-MCNC: 15.9 G/DL (ref 12–16)
IMM GRANULOCYTES # BLD AUTO: 0.01 K/UL (ref 0–0.11)
IMM GRANULOCYTES NFR BLD AUTO: 0.3 % (ref 0–0.9)
LYMPHOCYTES # BLD AUTO: 0.85 K/UL (ref 1–4.8)
LYMPHOCYTES NFR BLD: 23 % (ref 22–41)
MCH RBC QN AUTO: 32.9 PG (ref 27–33)
MCHC RBC AUTO-ENTMCNC: 32.6 G/DL (ref 33.6–35)
MCV RBC AUTO: 100.8 FL (ref 81.4–97.8)
MONOCYTES # BLD AUTO: 0.6 K/UL (ref 0–0.85)
MONOCYTES NFR BLD AUTO: 16.2 % (ref 0–13.4)
NEUTROPHILS # BLD AUTO: 2.16 K/UL (ref 2–7.15)
NEUTROPHILS NFR BLD: 58.3 % (ref 44–72)
NRBC # BLD AUTO: 0 K/UL
NRBC BLD-RTO: 0 /100 WBC
PLATELET # BLD AUTO: 125 K/UL (ref 164–446)
PMV BLD AUTO: 13.6 FL (ref 9–12.9)
POTASSIUM SERPL-SCNC: 3.6 MMOL/L (ref 3.6–5.5)
PROT SERPL-MCNC: 8.3 G/DL (ref 6–8.2)
RBC # BLD AUTO: 4.84 M/UL (ref 4.2–5.4)
SODIUM SERPL-SCNC: 140 MMOL/L (ref 135–145)
TREPONEMA PALLIDUM IGG+IGM AB [PRESENCE] IN SERUM OR PLASMA BY IMMUNOASSAY: NON REACTIVE
VIT B12 SERPL-MCNC: 246 PG/ML (ref 211–911)
WBC # BLD AUTO: 3.7 K/UL (ref 4.8–10.8)

## 2019-07-09 PROCEDURE — 80053 COMPREHEN METABOLIC PANEL: CPT

## 2019-07-09 PROCEDURE — 36415 COLL VENOUS BLD VENIPUNCTURE: CPT

## 2019-07-09 PROCEDURE — 83090 ASSAY OF HOMOCYSTEINE: CPT

## 2019-07-09 PROCEDURE — 86140 C-REACTIVE PROTEIN: CPT

## 2019-07-09 PROCEDURE — 86039 ANTINUCLEAR ANTIBODIES (ANA): CPT

## 2019-07-09 PROCEDURE — 85549 MURAMIDASE: CPT

## 2019-07-09 PROCEDURE — 83516 IMMUNOASSAY NONANTIBODY: CPT

## 2019-07-09 PROCEDURE — 86780 TREPONEMA PALLIDUM: CPT

## 2019-07-09 PROCEDURE — 82607 VITAMIN B-12: CPT

## 2019-07-09 PROCEDURE — 85652 RBC SED RATE AUTOMATED: CPT

## 2019-07-09 PROCEDURE — 82164 ANGIOTENSIN I ENZYME TEST: CPT

## 2019-07-09 PROCEDURE — 86147 CARDIOLIPIN ANTIBODY EA IG: CPT | Mod: 91

## 2019-07-09 PROCEDURE — 86255 FLUORESCENT ANTIBODY SCREEN: CPT

## 2019-07-09 PROCEDURE — 86038 ANTINUCLEAR ANTIBODIES: CPT

## 2019-07-09 PROCEDURE — 85025 COMPLETE CBC W/AUTO DIFF WBC: CPT

## 2019-07-09 PROCEDURE — 82746 ASSAY OF FOLIC ACID SERUM: CPT

## 2019-07-10 LAB — HCYS SERPL-SCNC: 33.33 UMOL/L

## 2019-07-11 LAB
ACE SERPL-CCNC: 34 U/L (ref 9–67)
AQP4 H2O CHANNEL AB SERPL IA-ACNC: 1 U/ML
CARDIOLIPIN IGA SER IA-ACNC: 4 APL (ref 0–11)
CARDIOLIPIN IGG SER IA-ACNC: 3 GPL (ref 0–14)
CARDIOLIPIN IGM SER IA-ACNC: 6 MPL (ref 0–12)
LYSOZYME SERPL-MCNC: 3.67 UG/ML (ref 0–2.75)
NUCLEAR IGG SER QL IA: DETECTED

## 2019-07-13 LAB
ANA INTERPRETIVE COMMENT Q5143: ABNORMAL
ANA PATTERN Q5144: ABNORMAL
ANA TITER Q5145: ABNORMAL
ANTINUCLEAR ANTIBODY (ANA), HEP-2, IGG Q5142: DETECTED
CYTOPLASMIC PATTERN TITER Q5148: ABNORMAL

## 2019-07-15 LAB — TEST NAME 95000: NORMAL

## 2019-07-19 ENCOUNTER — APPOINTMENT (OUTPATIENT)
Dept: RADIOLOGY | Facility: MEDICAL CENTER | Age: 64
End: 2019-07-19
Attending: PSYCHIATRY & NEUROLOGY
Payer: COMMERCIAL

## 2019-07-19 DIAGNOSIS — H53.462 LEFT HOMONYMOUS HEMIANOPSIA: ICD-10-CM

## 2019-07-19 DIAGNOSIS — I63.333 CEREBRAL INFARCTION DUE TO BILATERAL THROMBOSIS OF POSTERIOR CEREBRAL ARTERIES (HCC): ICD-10-CM

## 2019-07-19 DIAGNOSIS — H53.461 RIGHT HOMONYMOUS HEMIANOPSIA: ICD-10-CM

## 2019-07-19 PROCEDURE — 70547 MR ANGIOGRAPHY NECK W/O DYE: CPT

## 2019-07-19 PROCEDURE — 70540 MRI ORBIT/FACE/NECK W/O DYE: CPT

## 2019-07-19 PROCEDURE — 70544 MR ANGIOGRAPHY HEAD W/O DYE: CPT

## 2019-07-19 PROCEDURE — 70551 MRI BRAIN STEM W/O DYE: CPT
